# Patient Record
Sex: MALE | Race: BLACK OR AFRICAN AMERICAN | Employment: OTHER | URBAN - METROPOLITAN AREA
[De-identification: names, ages, dates, MRNs, and addresses within clinical notes are randomized per-mention and may not be internally consistent; named-entity substitution may affect disease eponyms.]

---

## 2018-06-11 ENCOUNTER — ANESTHESIA (OUTPATIENT)
Dept: SURGERY | Age: 68
End: 2018-06-11
Payer: MEDICARE

## 2018-06-11 ENCOUNTER — APPOINTMENT (OUTPATIENT)
Dept: ULTRASOUND IMAGING | Age: 68
End: 2018-06-11
Attending: EMERGENCY MEDICINE
Payer: MEDICARE

## 2018-06-11 ENCOUNTER — ANESTHESIA EVENT (OUTPATIENT)
Dept: SURGERY | Age: 68
End: 2018-06-11
Payer: MEDICARE

## 2018-06-11 ENCOUNTER — HOSPITAL ENCOUNTER (OUTPATIENT)
Age: 68
Setting detail: OBSERVATION
Discharge: HOME | End: 2018-06-12
Attending: EMERGENCY MEDICINE | Admitting: SURGERY
Payer: MEDICARE

## 2018-06-11 ENCOUNTER — APPOINTMENT (OUTPATIENT)
Dept: CT IMAGING | Age: 68
End: 2018-06-11
Attending: EMERGENCY MEDICINE
Payer: MEDICARE

## 2018-06-11 DIAGNOSIS — K80.00 ACUTE CALCULOUS CHOLECYSTITIS: ICD-10-CM

## 2018-06-11 DIAGNOSIS — R74.8 ELEVATED LIPASE: ICD-10-CM

## 2018-06-11 DIAGNOSIS — K80.00 CALCULUS OF GALLBLADDER WITH ACUTE CHOLECYSTITIS WITHOUT OBSTRUCTION: Primary | ICD-10-CM

## 2018-06-11 LAB
ALBUMIN SERPL-MCNC: 4.4 G/DL (ref 3.5–5)
ALBUMIN/GLOB SERPL: 1.1 {RATIO} (ref 1.1–2.2)
ALP SERPL-CCNC: 51 U/L (ref 45–117)
ALT SERPL-CCNC: 34 U/L (ref 12–78)
ANION GAP SERPL CALC-SCNC: 9 MMOL/L (ref 5–15)
APPEARANCE UR: CLEAR
AST SERPL-CCNC: 22 U/L (ref 15–37)
BACTERIA URNS QL MICRO: NEGATIVE /HPF
BASOPHILS # BLD: 0 K/UL (ref 0–0.1)
BASOPHILS NFR BLD: 0 % (ref 0–1)
BILIRUB SERPL-MCNC: 0.4 MG/DL (ref 0.2–1)
BILIRUB UR QL: NEGATIVE
BUN SERPL-MCNC: 13 MG/DL (ref 6–20)
BUN/CREAT SERPL: 11 (ref 12–20)
CALCIUM SERPL-MCNC: 9.5 MG/DL (ref 8.5–10.1)
CHLORIDE SERPL-SCNC: 108 MMOL/L (ref 97–108)
CO2 SERPL-SCNC: 26 MMOL/L (ref 21–32)
COLOR UR: ABNORMAL
CREAT SERPL-MCNC: 1.21 MG/DL (ref 0.7–1.3)
DIFFERENTIAL METHOD BLD: ABNORMAL
EOSINOPHIL # BLD: 0 K/UL (ref 0–0.4)
EOSINOPHIL NFR BLD: 0 % (ref 0–7)
EPITH CASTS URNS QL MICRO: ABNORMAL /LPF
ERYTHROCYTE [DISTWIDTH] IN BLOOD BY AUTOMATED COUNT: 15.3 % (ref 11.5–14.5)
GLOBULIN SER CALC-MCNC: 4 G/DL (ref 2–4)
GLUCOSE SERPL-MCNC: 119 MG/DL (ref 65–100)
GLUCOSE UR STRIP.AUTO-MCNC: NEGATIVE MG/DL
HCT VFR BLD AUTO: 42.3 % (ref 36.6–50.3)
HGB BLD-MCNC: 13.9 G/DL (ref 12.1–17)
HGB UR QL STRIP: NEGATIVE
HYALINE CASTS URNS QL MICRO: ABNORMAL /LPF (ref 0–5)
IMM GRANULOCYTES # BLD: 0 K/UL (ref 0–0.04)
IMM GRANULOCYTES NFR BLD AUTO: 0 % (ref 0–0.5)
KETONES UR QL STRIP.AUTO: NEGATIVE MG/DL
LEUKOCYTE ESTERASE UR QL STRIP.AUTO: NEGATIVE
LIPASE SERPL-CCNC: 490 U/L (ref 73–393)
LYMPHOCYTES # BLD: 0.8 K/UL (ref 0.8–3.5)
LYMPHOCYTES NFR BLD: 9 % (ref 12–49)
MCH RBC QN AUTO: 27.9 PG (ref 26–34)
MCHC RBC AUTO-ENTMCNC: 32.9 G/DL (ref 30–36.5)
MCV RBC AUTO: 84.9 FL (ref 80–99)
MONOCYTES # BLD: 0.3 K/UL (ref 0–1)
MONOCYTES NFR BLD: 4 % (ref 5–13)
NEUTS SEG # BLD: 7.5 K/UL (ref 1.8–8)
NEUTS SEG NFR BLD: 86 % (ref 32–75)
NITRITE UR QL STRIP.AUTO: NEGATIVE
NRBC # BLD: 0 K/UL (ref 0–0.01)
NRBC BLD-RTO: 0 PER 100 WBC
PH UR STRIP: 5 [PH] (ref 5–8)
PLATELET # BLD AUTO: 323 K/UL (ref 150–400)
PMV BLD AUTO: 9.5 FL (ref 8.9–12.9)
POTASSIUM SERPL-SCNC: 3.8 MMOL/L (ref 3.5–5.1)
PROT SERPL-MCNC: 8.4 G/DL (ref 6.4–8.2)
PROT UR STRIP-MCNC: 30 MG/DL
RBC # BLD AUTO: 4.98 M/UL (ref 4.1–5.7)
RBC #/AREA URNS HPF: ABNORMAL /HPF (ref 0–5)
SODIUM SERPL-SCNC: 143 MMOL/L (ref 136–145)
SP GR UR REFRACTOMETRY: 1.02 (ref 1–1.03)
UA: UC IF INDICATED,UAUC: ABNORMAL
UROBILINOGEN UR QL STRIP.AUTO: 0.2 EU/DL (ref 0.2–1)
WBC # BLD AUTO: 8.7 K/UL (ref 4.1–11.1)
WBC URNS QL MICRO: ABNORMAL /HPF (ref 0–4)

## 2018-06-11 PROCEDURE — 83690 ASSAY OF LIPASE: CPT | Performed by: EMERGENCY MEDICINE

## 2018-06-11 PROCEDURE — 77030013079 HC BLNKT BAIR HGGR 3M -A: Performed by: ANESTHESIOLOGY

## 2018-06-11 PROCEDURE — 76010000149 HC OR TIME 1 TO 1.5 HR: Performed by: SURGERY

## 2018-06-11 PROCEDURE — 74011250636 HC RX REV CODE- 250/636

## 2018-06-11 PROCEDURE — 99218 HC RM OBSERVATION: CPT

## 2018-06-11 PROCEDURE — 77030008756 HC TU IRR SUC STRY -B: Performed by: SURGERY

## 2018-06-11 PROCEDURE — 81001 URINALYSIS AUTO W/SCOPE: CPT | Performed by: EMERGENCY MEDICINE

## 2018-06-11 PROCEDURE — 77030019908 HC STETH ESOPH SIMS -A: Performed by: ANESTHESIOLOGY

## 2018-06-11 PROCEDURE — 99284 EMERGENCY DEPT VISIT MOD MDM: CPT

## 2018-06-11 PROCEDURE — 74011250636 HC RX REV CODE- 250/636: Performed by: ANESTHESIOLOGY

## 2018-06-11 PROCEDURE — 74011000250 HC RX REV CODE- 250

## 2018-06-11 PROCEDURE — 85025 COMPLETE CBC W/AUTO DIFF WBC: CPT | Performed by: EMERGENCY MEDICINE

## 2018-06-11 PROCEDURE — 74011636320 HC RX REV CODE- 636/320: Performed by: EMERGENCY MEDICINE

## 2018-06-11 PROCEDURE — 74011250636 HC RX REV CODE- 250/636: Performed by: EMERGENCY MEDICINE

## 2018-06-11 PROCEDURE — 77030011640 HC PAD GRND REM COVD -A: Performed by: SURGERY

## 2018-06-11 PROCEDURE — 74011000258 HC RX REV CODE- 258: Performed by: EMERGENCY MEDICINE

## 2018-06-11 PROCEDURE — 77030020053 HC ELECTRD LAPSCP COVD -B: Performed by: SURGERY

## 2018-06-11 PROCEDURE — 76060000033 HC ANESTHESIA 1 TO 1.5 HR: Performed by: SURGERY

## 2018-06-11 PROCEDURE — 77030035220 HC TRCR ENDOSC BLNTPRT ANCHR COVD -B: Performed by: SURGERY

## 2018-06-11 PROCEDURE — 76210000063 HC OR PH I REC FIRST 0.5 HR: Performed by: SURGERY

## 2018-06-11 PROCEDURE — 77030037892: Performed by: SURGERY

## 2018-06-11 PROCEDURE — 74011000250 HC RX REV CODE- 250: Performed by: SURGERY

## 2018-06-11 PROCEDURE — 77030039266 HC ADH SKN EXOFIN S2SG -A: Performed by: SURGERY

## 2018-06-11 PROCEDURE — 96375 TX/PRO/DX INJ NEW DRUG ADDON: CPT

## 2018-06-11 PROCEDURE — 88304 TISSUE EXAM BY PATHOLOGIST: CPT | Performed by: SURGERY

## 2018-06-11 PROCEDURE — 77030035048 HC TRCR ENDOSC OPTCL COVD -B: Performed by: SURGERY

## 2018-06-11 PROCEDURE — 74177 CT ABD & PELVIS W/CONTRAST: CPT

## 2018-06-11 PROCEDURE — 77030010513 HC APPL CLP LIG J&J -C: Performed by: SURGERY

## 2018-06-11 PROCEDURE — 36415 COLL VENOUS BLD VENIPUNCTURE: CPT | Performed by: EMERGENCY MEDICINE

## 2018-06-11 PROCEDURE — 77030002933 HC SUT MCRYL J&J -A: Performed by: SURGERY

## 2018-06-11 PROCEDURE — 76705 ECHO EXAM OF ABDOMEN: CPT

## 2018-06-11 PROCEDURE — 77030032490 HC SLV COMPR SCD KNE COVD -B: Performed by: SURGERY

## 2018-06-11 PROCEDURE — 77030008771 HC TU NG SALEM SUMP -A: Performed by: ANESTHESIOLOGY

## 2018-06-11 PROCEDURE — 77030026438 HC STYL ET INTUB CARD -A: Performed by: ANESTHESIOLOGY

## 2018-06-11 PROCEDURE — 96365 THER/PROPH/DIAG IV INF INIT: CPT

## 2018-06-11 PROCEDURE — 77030035051: Performed by: SURGERY

## 2018-06-11 PROCEDURE — 80053 COMPREHEN METABOLIC PANEL: CPT | Performed by: EMERGENCY MEDICINE

## 2018-06-11 PROCEDURE — 77030031139 HC SUT VCRL2 J&J -A: Performed by: SURGERY

## 2018-06-11 PROCEDURE — 77030008684 HC TU ET CUF COVD -B: Performed by: ANESTHESIOLOGY

## 2018-06-11 RX ORDER — ONDANSETRON 2 MG/ML
4 INJECTION INTRAMUSCULAR; INTRAVENOUS
Status: DISCONTINUED | OUTPATIENT
Start: 2018-06-11 | End: 2018-06-12 | Stop reason: HOSPADM

## 2018-06-11 RX ORDER — HYDROMORPHONE HYDROCHLORIDE 1 MG/ML
.2-.5 INJECTION, SOLUTION INTRAMUSCULAR; INTRAVENOUS; SUBCUTANEOUS
Status: DISCONTINUED | OUTPATIENT
Start: 2018-06-11 | End: 2018-06-11 | Stop reason: HOSPADM

## 2018-06-11 RX ORDER — MORPHINE SULFATE 4 MG/ML
4 INJECTION INTRAVENOUS ONCE
Status: COMPLETED | OUTPATIENT
Start: 2018-06-11 | End: 2018-06-11

## 2018-06-11 RX ORDER — LIDOCAINE HYDROCHLORIDE 20 MG/ML
INJECTION, SOLUTION EPIDURAL; INFILTRATION; INTRACAUDAL; PERINEURAL AS NEEDED
Status: DISCONTINUED | OUTPATIENT
Start: 2018-06-11 | End: 2018-06-11 | Stop reason: HOSPADM

## 2018-06-11 RX ORDER — BUPIVACAINE HYDROCHLORIDE 5 MG/ML
INJECTION, SOLUTION EPIDURAL; INTRACAUDAL AS NEEDED
Status: DISCONTINUED | OUTPATIENT
Start: 2018-06-11 | End: 2018-06-11 | Stop reason: HOSPADM

## 2018-06-11 RX ORDER — MIDAZOLAM HYDROCHLORIDE 1 MG/ML
0.5 INJECTION, SOLUTION INTRAMUSCULAR; INTRAVENOUS
Status: DISCONTINUED | OUTPATIENT
Start: 2018-06-11 | End: 2018-06-11 | Stop reason: HOSPADM

## 2018-06-11 RX ORDER — ONDANSETRON 2 MG/ML
4 INJECTION INTRAMUSCULAR; INTRAVENOUS AS NEEDED
Status: DISCONTINUED | OUTPATIENT
Start: 2018-06-11 | End: 2018-06-11 | Stop reason: HOSPADM

## 2018-06-11 RX ORDER — METOCLOPRAMIDE HYDROCHLORIDE 5 MG/ML
10 INJECTION INTRAMUSCULAR; INTRAVENOUS
Status: COMPLETED | OUTPATIENT
Start: 2018-06-11 | End: 2018-06-11

## 2018-06-11 RX ORDER — DEXAMETHASONE SODIUM PHOSPHATE 4 MG/ML
INJECTION, SOLUTION INTRA-ARTICULAR; INTRALESIONAL; INTRAMUSCULAR; INTRAVENOUS; SOFT TISSUE AS NEEDED
Status: DISCONTINUED | OUTPATIENT
Start: 2018-06-11 | End: 2018-06-11 | Stop reason: HOSPADM

## 2018-06-11 RX ORDER — GLYCOPYRROLATE 0.2 MG/ML
INJECTION INTRAMUSCULAR; INTRAVENOUS AS NEEDED
Status: DISCONTINUED | OUTPATIENT
Start: 2018-06-11 | End: 2018-06-11 | Stop reason: HOSPADM

## 2018-06-11 RX ORDER — SODIUM CHLORIDE 9 MG/ML
150 INJECTION, SOLUTION INTRAVENOUS ONCE
Status: COMPLETED | OUTPATIENT
Start: 2018-06-11 | End: 2018-06-11

## 2018-06-11 RX ORDER — SODIUM CHLORIDE 0.9 % (FLUSH) 0.9 %
5-10 SYRINGE (ML) INJECTION AS NEEDED
Status: DISCONTINUED | OUTPATIENT
Start: 2018-06-11 | End: 2018-06-11 | Stop reason: HOSPADM

## 2018-06-11 RX ORDER — SODIUM CHLORIDE 0.9 % (FLUSH) 0.9 %
5-10 SYRINGE (ML) INJECTION EVERY 8 HOURS
Status: DISCONTINUED | OUTPATIENT
Start: 2018-06-11 | End: 2018-06-12 | Stop reason: HOSPADM

## 2018-06-11 RX ORDER — DEXTROSE, SODIUM CHLORIDE, AND POTASSIUM CHLORIDE 5; .45; .15 G/100ML; G/100ML; G/100ML
INJECTION INTRAVENOUS
Status: COMPLETED
Start: 2018-06-11 | End: 2018-06-11

## 2018-06-11 RX ORDER — MIDAZOLAM HYDROCHLORIDE 1 MG/ML
INJECTION, SOLUTION INTRAMUSCULAR; INTRAVENOUS AS NEEDED
Status: DISCONTINUED | OUTPATIENT
Start: 2018-06-11 | End: 2018-06-11 | Stop reason: HOSPADM

## 2018-06-11 RX ORDER — ACETAMINOPHEN 10 MG/ML
INJECTION, SOLUTION INTRAVENOUS AS NEEDED
Status: DISCONTINUED | OUTPATIENT
Start: 2018-06-11 | End: 2018-06-11 | Stop reason: HOSPADM

## 2018-06-11 RX ORDER — SODIUM CHLORIDE 0.9 % (FLUSH) 0.9 %
5-10 SYRINGE (ML) INJECTION AS NEEDED
Status: DISCONTINUED | OUTPATIENT
Start: 2018-06-11 | End: 2018-06-12 | Stop reason: HOSPADM

## 2018-06-11 RX ORDER — DEXTROSE, SODIUM CHLORIDE, AND POTASSIUM CHLORIDE 5; .45; .15 G/100ML; G/100ML; G/100ML
125 INJECTION INTRAVENOUS CONTINUOUS
Status: DISCONTINUED | OUTPATIENT
Start: 2018-06-11 | End: 2018-06-12 | Stop reason: HOSPADM

## 2018-06-11 RX ORDER — ONDANSETRON 2 MG/ML
8 INJECTION INTRAMUSCULAR; INTRAVENOUS
Status: COMPLETED | OUTPATIENT
Start: 2018-06-11 | End: 2018-06-11

## 2018-06-11 RX ORDER — FINASTERIDE 5 MG/1
5 TABLET, FILM COATED ORAL DAILY
COMMUNITY

## 2018-06-11 RX ORDER — LIDOCAINE HYDROCHLORIDE 10 MG/ML
0.1 INJECTION, SOLUTION EPIDURAL; INFILTRATION; INTRACAUDAL; PERINEURAL AS NEEDED
Status: DISCONTINUED | OUTPATIENT
Start: 2018-06-11 | End: 2018-06-11 | Stop reason: HOSPADM

## 2018-06-11 RX ORDER — HYDROMORPHONE HYDROCHLORIDE 2 MG/ML
0.5 INJECTION, SOLUTION INTRAMUSCULAR; INTRAVENOUS; SUBCUTANEOUS
Status: DISCONTINUED | OUTPATIENT
Start: 2018-06-11 | End: 2018-06-12 | Stop reason: HOSPADM

## 2018-06-11 RX ORDER — FENTANYL CITRATE 50 UG/ML
INJECTION, SOLUTION INTRAMUSCULAR; INTRAVENOUS AS NEEDED
Status: DISCONTINUED | OUTPATIENT
Start: 2018-06-11 | End: 2018-06-11 | Stop reason: HOSPADM

## 2018-06-11 RX ORDER — DIPHENHYDRAMINE HYDROCHLORIDE 50 MG/ML
25 INJECTION, SOLUTION INTRAMUSCULAR; INTRAVENOUS
Status: COMPLETED | OUTPATIENT
Start: 2018-06-11 | End: 2018-06-11

## 2018-06-11 RX ORDER — EPHEDRINE SULFATE 50 MG/ML
INJECTION, SOLUTION INTRAVENOUS AS NEEDED
Status: DISCONTINUED | OUTPATIENT
Start: 2018-06-11 | End: 2018-06-11 | Stop reason: HOSPADM

## 2018-06-11 RX ORDER — FENTANYL CITRATE 50 UG/ML
25 INJECTION, SOLUTION INTRAMUSCULAR; INTRAVENOUS
Status: DISCONTINUED | OUTPATIENT
Start: 2018-06-11 | End: 2018-06-11 | Stop reason: HOSPADM

## 2018-06-11 RX ORDER — FENOFIBRATE 120 MG/1
TABLET ORAL
COMMUNITY
End: 2019-01-18

## 2018-06-11 RX ORDER — SODIUM CHLORIDE 0.9 % (FLUSH) 0.9 %
10 SYRINGE (ML) INJECTION
Status: COMPLETED | OUTPATIENT
Start: 2018-06-11 | End: 2018-06-11

## 2018-06-11 RX ORDER — HYDROMORPHONE HYDROCHLORIDE 1 MG/ML
0.5 INJECTION, SOLUTION INTRAMUSCULAR; INTRAVENOUS; SUBCUTANEOUS
Status: DISCONTINUED | OUTPATIENT
Start: 2018-06-11 | End: 2018-06-11

## 2018-06-11 RX ORDER — PROPOFOL 10 MG/ML
INJECTION, EMULSION INTRAVENOUS AS NEEDED
Status: DISCONTINUED | OUTPATIENT
Start: 2018-06-11 | End: 2018-06-11 | Stop reason: HOSPADM

## 2018-06-11 RX ORDER — SODIUM CHLORIDE 9 MG/ML
50 INJECTION, SOLUTION INTRAVENOUS
Status: COMPLETED | OUTPATIENT
Start: 2018-06-11 | End: 2018-06-11

## 2018-06-11 RX ORDER — NEOSTIGMINE METHYLSULFATE 1 MG/ML
INJECTION INTRAVENOUS AS NEEDED
Status: DISCONTINUED | OUTPATIENT
Start: 2018-06-11 | End: 2018-06-11 | Stop reason: HOSPADM

## 2018-06-11 RX ORDER — ROCURONIUM BROMIDE 10 MG/ML
INJECTION, SOLUTION INTRAVENOUS AS NEEDED
Status: DISCONTINUED | OUTPATIENT
Start: 2018-06-11 | End: 2018-06-11 | Stop reason: HOSPADM

## 2018-06-11 RX ORDER — HYDROCODONE BITARTRATE AND ACETAMINOPHEN 5; 325 MG/1; MG/1
1 TABLET ORAL
Status: DISCONTINUED | OUTPATIENT
Start: 2018-06-11 | End: 2018-06-12 | Stop reason: HOSPADM

## 2018-06-11 RX ORDER — SUCCINYLCHOLINE CHLORIDE 20 MG/ML
INJECTION INTRAMUSCULAR; INTRAVENOUS AS NEEDED
Status: DISCONTINUED | OUTPATIENT
Start: 2018-06-11 | End: 2018-06-11 | Stop reason: HOSPADM

## 2018-06-11 RX ORDER — GUAIFENESIN 100 MG/5ML
81 LIQUID (ML) ORAL DAILY
Status: ON HOLD | COMMUNITY
End: 2019-01-18 | Stop reason: SDUPTHER

## 2018-06-11 RX ORDER — ONDANSETRON 2 MG/ML
INJECTION INTRAMUSCULAR; INTRAVENOUS AS NEEDED
Status: DISCONTINUED | OUTPATIENT
Start: 2018-06-11 | End: 2018-06-11 | Stop reason: HOSPADM

## 2018-06-11 RX ORDER — TAMSULOSIN HYDROCHLORIDE 0.4 MG/1
0.4 CAPSULE ORAL DAILY
COMMUNITY

## 2018-06-11 RX ORDER — SODIUM CHLORIDE, SODIUM LACTATE, POTASSIUM CHLORIDE, CALCIUM CHLORIDE 600; 310; 30; 20 MG/100ML; MG/100ML; MG/100ML; MG/100ML
25 INJECTION, SOLUTION INTRAVENOUS CONTINUOUS
Status: DISCONTINUED | OUTPATIENT
Start: 2018-06-11 | End: 2018-06-11 | Stop reason: HOSPADM

## 2018-06-11 RX ORDER — FENTANYL CITRATE 50 UG/ML
50 INJECTION, SOLUTION INTRAMUSCULAR; INTRAVENOUS AS NEEDED
Status: DISCONTINUED | OUTPATIENT
Start: 2018-06-11 | End: 2018-06-11 | Stop reason: HOSPADM

## 2018-06-11 RX ORDER — DIPHENHYDRAMINE HYDROCHLORIDE 50 MG/ML
12.5 INJECTION, SOLUTION INTRAMUSCULAR; INTRAVENOUS AS NEEDED
Status: DISCONTINUED | OUTPATIENT
Start: 2018-06-11 | End: 2018-06-11 | Stop reason: HOSPADM

## 2018-06-11 RX ORDER — MORPHINE SULFATE 10 MG/ML
2 INJECTION, SOLUTION INTRAMUSCULAR; INTRAVENOUS
Status: DISCONTINUED | OUTPATIENT
Start: 2018-06-11 | End: 2018-06-11 | Stop reason: HOSPADM

## 2018-06-11 RX ADMIN — NEOSTIGMINE METHYLSULFATE 2 MG: 1 INJECTION INTRAVENOUS at 21:20

## 2018-06-11 RX ADMIN — SODIUM CHLORIDE 1000 ML: 900 INJECTION, SOLUTION INTRAVENOUS at 14:13

## 2018-06-11 RX ADMIN — GLYCOPYRROLATE 0.2 MG: 0.2 INJECTION INTRAMUSCULAR; INTRAVENOUS at 21:20

## 2018-06-11 RX ADMIN — FENTANYL CITRATE 50 MCG: 50 INJECTION, SOLUTION INTRAMUSCULAR; INTRAVENOUS at 20:36

## 2018-06-11 RX ADMIN — DEXTROSE MONOHYDRATE, SODIUM CHLORIDE, AND POTASSIUM CHLORIDE 125 ML/HR: 50; 4.5; 1.49 INJECTION, SOLUTION INTRAVENOUS at 21:50

## 2018-06-11 RX ADMIN — IOPAMIDOL 100 ML: 755 INJECTION, SOLUTION INTRAVENOUS at 16:06

## 2018-06-11 RX ADMIN — LIDOCAINE HYDROCHLORIDE 100 MG: 20 INJECTION, SOLUTION EPIDURAL; INFILTRATION; INTRACAUDAL; PERINEURAL at 20:36

## 2018-06-11 RX ADMIN — MIDAZOLAM HYDROCHLORIDE 2 MG: 1 INJECTION, SOLUTION INTRAMUSCULAR; INTRAVENOUS at 20:31

## 2018-06-11 RX ADMIN — METOCLOPRAMIDE 10 MG: 5 INJECTION, SOLUTION INTRAMUSCULAR; INTRAVENOUS at 17:49

## 2018-06-11 RX ADMIN — PROPOFOL 150 MG: 10 INJECTION, EMULSION INTRAVENOUS at 20:36

## 2018-06-11 RX ADMIN — ROCURONIUM BROMIDE 25 MG: 10 INJECTION, SOLUTION INTRAVENOUS at 20:48

## 2018-06-11 RX ADMIN — SODIUM CHLORIDE 150 ML/HR: 900 INJECTION, SOLUTION INTRAVENOUS at 17:50

## 2018-06-11 RX ADMIN — MORPHINE SULFATE 4 MG: 4 INJECTION INTRAVENOUS at 14:12

## 2018-06-11 RX ADMIN — DEXAMETHASONE SODIUM PHOSPHATE 8 MG: 4 INJECTION, SOLUTION INTRA-ARTICULAR; INTRALESIONAL; INTRAMUSCULAR; INTRAVENOUS; SOFT TISSUE at 20:55

## 2018-06-11 RX ADMIN — ACETAMINOPHEN 1000 MG: 10 INJECTION, SOLUTION INTRAVENOUS at 21:06

## 2018-06-11 RX ADMIN — SUCCINYLCHOLINE CHLORIDE 140 MG: 20 INJECTION INTRAMUSCULAR; INTRAVENOUS at 20:37

## 2018-06-11 RX ADMIN — DEXTROSE, SODIUM CHLORIDE, AND POTASSIUM CHLORIDE 125 ML/HR: 5; .45; .15 INJECTION INTRAVENOUS at 21:50

## 2018-06-11 RX ADMIN — PIPERACILLIN SODIUM,TAZOBACTAM SODIUM 4.5 G: 4; .5 INJECTION, POWDER, FOR SOLUTION INTRAVENOUS at 18:37

## 2018-06-11 RX ADMIN — Medication 10 ML: at 16:06

## 2018-06-11 RX ADMIN — EPHEDRINE SULFATE 10 MG: 50 INJECTION, SOLUTION INTRAVENOUS at 20:43

## 2018-06-11 RX ADMIN — Medication 10 ML: at 22:30

## 2018-06-11 RX ADMIN — FENTANYL CITRATE 50 MCG: 50 INJECTION, SOLUTION INTRAMUSCULAR; INTRAVENOUS at 20:55

## 2018-06-11 RX ADMIN — PROPOFOL 50 MG: 10 INJECTION, EMULSION INTRAVENOUS at 20:37

## 2018-06-11 RX ADMIN — SODIUM CHLORIDE 50 ML/HR: 900 INJECTION, SOLUTION INTRAVENOUS at 16:06

## 2018-06-11 RX ADMIN — ONDANSETRON 4 MG: 2 INJECTION INTRAMUSCULAR; INTRAVENOUS at 20:55

## 2018-06-11 RX ADMIN — DIPHENHYDRAMINE HYDROCHLORIDE 25 MG: 50 INJECTION INTRAMUSCULAR; INTRAVENOUS at 17:49

## 2018-06-11 RX ADMIN — SODIUM CHLORIDE, POTASSIUM CHLORIDE, SODIUM LACTATE AND CALCIUM CHLORIDE: 600; 310; 30; 20 INJECTION, SOLUTION INTRAVENOUS at 20:32

## 2018-06-11 RX ADMIN — ONDANSETRON 8 MG: 2 INJECTION, SOLUTION INTRAMUSCULAR; INTRAVENOUS at 14:12

## 2018-06-11 NOTE — H&P
Surgery History and Physcial    Subjective:      Norman Ewing is a 75 y.o. male who presents for evaluation of abdominal pain. The pain is located in the RUQ without radiation. Pain is described as sharp and stabbing and measures 8/10 in intensity. Onset of pain was 20 hours ago. Aggravating factors include eating. Alleviating factors include none. Associated symptoms include anorexia, chills, diarrhea, nausea and vomiting. Patient Active Problem List    Diagnosis Date Noted    Acute calculous cholecystitis 06/11/2018     History reviewed. No pertinent past medical history. History reviewed. No pertinent surgical history. Social History   Substance Use Topics    Smoking status: Not on file    Smokeless tobacco: Not on file    Alcohol use Not on file      History reviewed. No pertinent family history. Prior to Admission medications    Medication Sig Start Date End Date Taking? Authorizing Provider   finasteride (PROSCAR) 5 mg tablet Take 5 mg by mouth daily. Yes Michelle Cummings MD   Fenofibrate 120 mg tab Take  by mouth. Yes Michelle Cummings MD   tamsulosin (FLOMAX) 0.4 mg capsule Take 0.4 mg by mouth daily. Yes Michelle Cummings MD   aspirin 81 mg chewable tablet Take 81 mg by mouth daily. Yes Michelle Cummings MD   SIMVASTATIN PO Take  by mouth. Yes Michelle Cummings MD     No Known Allergies      Review of Systems   Constitutional: Positive for appetite change and chills. Negative for diaphoresis and fever. Respiratory: Negative for shortness of breath and wheezing. Cardiovascular: Negative for chest pain and palpitations. Gastrointestinal: Positive for abdominal pain, diarrhea, nausea and vomiting. Musculoskeletal: Negative for myalgias. Hematological: Does not bruise/bleed easily.         Objective:     Visit Vitals    /86    Pulse (!) 50    Temp 99.1 °F (37.3 °C)    Resp 15    Ht 5' 7\" (1.702 m)    Wt 156 lb 15.5 oz (71.2 kg)    SpO2 100%    BMI 24.58 kg/m2       Physical Exam Constitutional: He appears well-developed and well-nourished. No distress. HENT:   Head: Normocephalic and atraumatic. Cardiovascular: Normal rate, regular rhythm, normal heart sounds and intact distal pulses. Pulmonary/Chest: Breath sounds normal. He has no wheezes. He has no rales. Abdominal: Soft. Bowel sounds are normal. He exhibits no distension and no mass. There is no hepatosplenomegaly. There is tenderness in the right upper quadrant. There is positive Hawk's sign. There is no rigidity, no rebound, no guarding and no tenderness at McBurney's point. No hernia. Musculoskeletal: Normal range of motion. Lymphadenopathy:     He has no cervical adenopathy.        Imaging:  images and reports reviewed    Lab Review:    Recent Results (from the past 24 hour(s))   URINALYSIS W/ REFLEX CULTURE    Collection Time: 06/11/18  1:48 PM   Result Value Ref Range    Color YELLOW/STRAW      Appearance CLEAR CLEAR      Specific gravity 1.025 1.003 - 1.030      pH (UA) 5.0 5.0 - 8.0      Protein 30 (A) NEG mg/dL    Glucose NEGATIVE  NEG mg/dL    Ketone NEGATIVE  NEG mg/dL    Bilirubin NEGATIVE  NEG      Blood NEGATIVE  NEG      Urobilinogen 0.2 0.2 - 1.0 EU/dL    Nitrites NEGATIVE  NEG      Leukocyte Esterase NEGATIVE  NEG      WBC 0-4 0 - 4 /hpf    RBC 5-10 0 - 5 /hpf    Epithelial cells FEW FEW /lpf    Bacteria NEGATIVE  NEG /hpf    UA:UC IF INDICATED CULTURE NOT INDICATED BY UA RESULT CNI      Hyaline cast 0-2 0 - 5 /lpf   CBC WITH AUTOMATED DIFF    Collection Time: 06/11/18  2:07 PM   Result Value Ref Range    WBC 8.7 4.1 - 11.1 K/uL    RBC 4.98 4.10 - 5.70 M/uL    HGB 13.9 12.1 - 17.0 g/dL    HCT 42.3 36.6 - 50.3 %    MCV 84.9 80.0 - 99.0 FL    MCH 27.9 26.0 - 34.0 PG    MCHC 32.9 30.0 - 36.5 g/dL    RDW 15.3 (H) 11.5 - 14.5 %    PLATELET 651 187 - 328 K/uL    MPV 9.5 8.9 - 12.9 FL    NRBC 0.0 0  WBC    ABSOLUTE NRBC 0.00 0.00 - 0.01 K/uL    NEUTROPHILS 86 (H) 32 - 75 %    LYMPHOCYTES 9 (L) 12 - 49 %    MONOCYTES 4 (L) 5 - 13 %    EOSINOPHILS 0 0 - 7 %    BASOPHILS 0 0 - 1 %    IMMATURE GRANULOCYTES 0 0.0 - 0.5 %    ABS. NEUTROPHILS 7.5 1.8 - 8.0 K/UL    ABS. LYMPHOCYTES 0.8 0.8 - 3.5 K/UL    ABS. MONOCYTES 0.3 0.0 - 1.0 K/UL    ABS. EOSINOPHILS 0.0 0.0 - 0.4 K/UL    ABS. BASOPHILS 0.0 0.0 - 0.1 K/UL    ABS. IMM. GRANS. 0.0 0.00 - 0.04 K/UL    DF AUTOMATED     METABOLIC PANEL, COMPREHENSIVE    Collection Time: 06/11/18  2:07 PM   Result Value Ref Range    Sodium 143 136 - 145 mmol/L    Potassium 3.8 3.5 - 5.1 mmol/L    Chloride 108 97 - 108 mmol/L    CO2 26 21 - 32 mmol/L    Anion gap 9 5 - 15 mmol/L    Glucose 119 (H) 65 - 100 mg/dL    BUN 13 6 - 20 MG/DL    Creatinine 1.21 0.70 - 1.30 MG/DL    BUN/Creatinine ratio 11 (L) 12 - 20      GFR est AA >60 >60 ml/min/1.73m2    GFR est non-AA 60 (L) >60 ml/min/1.73m2    Calcium 9.5 8.5 - 10.1 MG/DL    Bilirubin, total 0.4 0.2 - 1.0 MG/DL    ALT (SGPT) 34 12 - 78 U/L    AST (SGOT) 22 15 - 37 U/L    Alk. phosphatase 51 45 - 117 U/L    Protein, total 8.4 (H) 6.4 - 8.2 g/dL    Albumin 4.4 3.5 - 5.0 g/dL    Globulin 4.0 2.0 - 4.0 g/dL    A-G Ratio 1.1 1.1 - 2.2     LIPASE    Collection Time: 06/11/18  2:07 PM   Result Value Ref Range    Lipase 490 (H) 73 - 393 U/L         Assessment:     Abdominal pain, suspect acute calculous cholecystitis with associated gallbladder wall thickening on ultrasound. LFT's and CBD diameter wnl. Plan:     1. I recommend proceeding with Surgery:  laparoscopic cholecystectomy. 2. Discussed aspects of surgical intervention, methods, risks including by not limited to infection, bleeding, hematoma, and perforation of the intestines or solid organs, injury to common bile duct, conversion to open operation, need for additional procedures, and the risks of general anesthetic. The patient understands the risks; any and all questions were answered to the patient's satisfaction.     Signed By: True Holt MD, [facility]    June 11, 2018

## 2018-06-11 NOTE — ED NOTES
Pt report given to or, pt instructed to remove clothing, do chlorhexidine bath and replace new gown.

## 2018-06-11 NOTE — IP AVS SNAPSHOT
[address] [facility] 
[phone] Patient: Cherry Caldwell MRN: [id number] CSS:[id number] A check mellisa indicates which time of day the medication should be taken. My Medications START taking these medications Instructions Each Dose to Equal  
 Morning Noon Evening Bedtime HYDROcodone-acetaminophen 5-325 mg per tablet Commonly known as:  Shanelle Menchaca Your last dose was: Your next dose is: Take 1 Tab by mouth every four (4) hours as needed. Max Daily Amount: 6 Tabs. 1 Tab CONTINUE taking these medications Instructions Each Dose to Equal  
 Morning Noon Evening Bedtime  
 aspirin 81 mg chewable tablet Your last dose was: Your next dose is: Take 81 mg by mouth daily. 81 mg Fenofibrate 120 mg Tab Your last dose was: Your next dose is: Take  by mouth. finasteride 5 mg tablet Commonly known as:  PROSCAR Your last dose was: Your next dose is: Take 5 mg by mouth daily. 5 mg  
    
   
   
   
  
 SIMVASTATIN PO Your last dose was: Your next dose is: Take  by mouth. tamsulosin 0.4 mg capsule Commonly known as:  FLOMAX Your last dose was: Your next dose is: Take 0.4 mg by mouth daily. 0.4 mg Where to Get Your Medications Information on where to get these meds will be given to you by the nurse or doctor. ! Ask your nurse or doctor about these medications HYDROcodone-acetaminophen 5-325 mg per tablet

## 2018-06-11 NOTE — IP AVS SNAPSHOT
[address] 
[phone] Patient: Jacqueline Zaragoza MRN: [id number] RFY:[id number] About your hospitalization You were admitted on:  June 11, 2018 You last received care in the:  \[facility]\"" 2 GENERAL SURGERY You were discharged on:  June 12, 2018 Why you were hospitalized Your primary diagnosis was:  Acute Calculous Cholecystitis Follow-up Information Follow up With Details Comments Contact Info Cathryn Lopez MD   [address] [address] 
[phone] Discharge Orders None A check mellisa indicates which time of day the medication should be taken. My Medications START taking these medications Instructions Each Dose to Equal  
 Morning Noon Evening Bedtime HYDROcodone-acetaminophen 5-325 mg per tablet Commonly known as:  Marvelyn Code Your last dose was: Your next dose is: Take 1 Tab by mouth every four (4) hours as needed. Max Daily Amount: 6 Tabs. 1 Tab CONTINUE taking these medications Instructions Each Dose to Equal  
 Morning Noon Evening Bedtime  
 aspirin 81 mg chewable tablet Your last dose was: Your next dose is: Take 81 mg by mouth daily. 81 mg Fenofibrate 120 mg Tab Your last dose was: Your next dose is: Take  by mouth. finasteride 5 mg tablet Commonly known as:  PROSCAR Your last dose was: Your next dose is: Take 5 mg by mouth daily. 5 mg  
    
   
   
   
  
 SIMVASTATIN PO Your last dose was: Your next dose is: Take  by mouth. tamsulosin 0.4 mg capsule Commonly known as:  FLOMAX Your last dose was: Your next dose is: Take 0.4 mg by mouth daily.   
 0.4 mg  
    
   
 Where to Get Your Medications Information on where to get these meds will be given to you by the nurse or doctor. ! Ask your nurse or doctor about these medications HYDROcodone-acetaminophen 5-325 mg per tablet Opioid Education Prescription Opioids: What You Need to Know: 
 
Prescription opioids can be used to help relieve moderate-to-severe pain and are often prescribed following a surgery or injury, or for certain health conditions. These medications can be an important part of treatment but also come with serious risks. Opioids are strong pain medicines. Examples include hydrocodone, oxycodone, fentanyl, and morphine. Heroin is an example of an illegal opioid. It is important to work with your health care provider to make sure you are getting the safest, most effective care. WHAT ARE THE RISKS AND SIDE EFFECTS OF OPIOID USE? Prescription opioids carry serious risks of addiction and overdose, especially with prolonged use. An opioid overdose, often marked by slow breathing, can cause sudden death. The use of prescription opioids can have a number of side effects as well, even when taken as directed. · Tolerance-meaning you might need to take more of a medication for the same pain relief · Physical dependence-meaning you have symptoms of withdrawal when the medication is stopped. Withdrawal symptoms can include nausea, sweating, chills, diarrhea, stomach cramps, and muscle aches. Withdrawal can last up to several weeks, depending on which drug you took and how long you took it. · Increased sensitivity to pain · Constipation · Nausea, vomiting, and dry mouth · Sleepiness and dizziness · Confusion · Depression · Low levels of testosterone that can result in lower sex drive, energy, and strength · Itching and sweating RISKS ARE GREATER WITH:      
· History of drug misuse, substance use disorder, or overdose · Mental health conditions (such as depression or anxiety) · Sleep apnea · Older age (72 years or older) · Pregnancy Avoid alcohol while taking prescription opioids. Also, unless specifically advised by your health care provider, medications to avoid include: · Benzodiazepines (such as Xanax or Valium) · Muscle relaxants (such as Soma or Flexeril) · Hypnotics (such as Ambien or Lunesta) · Other prescription opioids KNOW YOUR OPTIONS Talk to your health care provider about ways to manage your pain that don't involve prescription opioids. Some of these options may actually work better and have fewer risks and side effects. Options may include: 
· Pain relievers such as acetaminophen, ibuprofen, and naproxen · Some medications that are also used for depression or seizures · Physical therapy and exercise · Counseling to help patients learn how to cope better with triggers of pain and stress. · Application of heat or cold compress · Massage therapy · Relaxation techniques Be Informed Make sure you know the name of your medication, how much and how often to take it, and its potential risks & side effects. IF YOU ARE PRESCRIBED OPIOIDS FOR PAIN: 
· Never take opioids in greater amounts or more often than prescribed. Remember the goal is not to be pain-free but to manage your pain at a tolerable level. · Follow up with your primary care provider to: · Work together to create a plan on how to manage your pain. · Talk about ways to help manage your pain that don't involve prescription opioids. · Talk about any and all concerns and side effects. · Help prevent misuse and abuse. · Never sell or share prescription opioids · Help prevent misuse and abuse. · Store prescription opioids in a secure place and out of reach of others (this may include visitors, children, friends, and family).  
· Safely dispose of unused/unwanted prescription opioids: Find your community drug take-back program or your pharmacy mail-back program, or flush them down the toilet, following guidance from the Food and Drug Administration (www.fda.gov/Drugs/ResourcesForYou). · Visit www.cdc.gov/drugoverdose to learn about the risks of opioid abuse and overdose. · If you believe you may be struggling with addiction, tell your health care provider and ask for guidance or call [facility] at 8-297-459-NUWM. Discharge Instructions Cholecystectomy: What to Expect at [facility] Your Recovery After your surgery, it is normal to feel weak and tired for several days after you return home. Your belly may be swollen. If you had laparoscopic surgery, you may also have pain in your shoulder for about 24 hours. You may have gas or need to burp a lot at first, and a few people get diarrhea. The diarrhea usually goes away in 2 to 4 weeks, but it may last longer. How quickly you recover depends on whether you had a laparoscopic or open surgery. · For a laparoscopic surgery, most people can go back to work or their normal routine in 1 to 2 weeks, but it may take longer, depending on the type of work you do. · For an open surgery, it will probably take 4 to 6 weeks before you get back to your normal routine. This care sheet gives you a general idea about how long it will take for you to recover. However, each person recovers at a different pace. Follow the steps below to get better as quickly as possible. How can you care for yourself at home? Activity · Rest when you feel tired. Getting enough sleep will help you recover. · Try to walk each day. Start out by walking a little more than you did the day before. Gradually increase the amount you walk. Walking boosts blood flow and helps prevent pneumonia and constipation.  
· For about 2 to 4 weeks, avoid lifting anything that would make you strain. This may include a child, heavy grocery bags and milk containers, a heavy briefcase or backpack, cat litter or dog food bags, or a vacuum . · Avoid strenuous activities, such as biking, jogging, weightlifting, and aerobic exercise, until your doctor says it is okay. · You may shower 24 to 48 hours after surgery, if your doctor okays it. Pat the cut (incision) dry. Do not take a bath for the first 2 weeks, or until your doctor tells you it is okay. · You may drive when you are no longer taking pain medicine and can quickly move your foot from the gas pedal to the brake. You must also be able to sit comfortably for a long period of time, even if you do not plan to go far. You might get caught in traffic. · For a laparoscopic surgery, most people can go back to work or their normal routine in 1 to 2 weeks, but it may take longer. For an open surgery, it will probably take 4 to 6 weeks before you get back to your normal routine. · Your doctor will tell you when you can have sex again. Diet · Eat smaller meals more often instead of fewer larger meals. You can eat a normal diet, but avoid eating fatty foods for about 1 month. Fatty foods include hamburger, whole milk, cheese, and many snack foods. If your stomach is upset, try bland, low-fat foods like plain rice, broiled chicken, toast, and yogurt. · Drink plenty of fluids (unless your doctor tells you not to). · If you have diarrhea, try avoiding spicy foods, dairy products, fatty foods, and alcohol. You can also watch to see if specific foods cause it, and stop eating them. If the diarrhea continues for more than 2 weeks, talk to your doctor. · You may notice that your bowel movements are not regular right after your surgery. This is common. Try to avoid constipation and straining with bowel movements. You may want to take a fiber supplement every day.  If you have not had a bowel movement after a couple of days, ask your doctor about taking a mild laxative. Medicines · Take pain medicines exactly as directed. ¨ If the doctor gave you a prescription medicine for pain, take it as prescribed. ¨ If you are not taking a prescription pain medicine, take an over-the-counter medicine such as acetaminophen (Tylenol), ibuprofen (Advil, Motrin), or naproxen (Aleve). Read and follow all instructions on the label. ¨ Do not take two or more pain medicines at the same time unless the doctor told you to. Many pain medicines contain acetaminophen, which is Tylenol. Too much Tylenol can be harmful. · If you think your pain medicine is making you sick to your stomach: 
¨ Take your medicine after meals (unless your doctor tells you not to). ¨ Ask your doctor for a different pain medicine. · If your doctor prescribed antibiotics, take them as directed. Do not stop taking them just because you feel better. You need to take the full course of antibiotics. Incision care · If you have strips of tape on the incision, or cut, leave the tape on for a week or until it falls off. · After 24 to 48 hours, wash the area daily with warm, soapy water, and pat it dry. · You may have staples to hold the cut together. Keep them dry until your doctor takes them out. This is usually in 7 to 10 days. · Keep the area clean and dry. You may cover it with a gauze bandage if it weeps or rubs against clothing. Change the bandage every day. Ice · To reduce swelling and pain, put ice or a cold pack on your belly for 10 to 20 minutes at a time. Do this every 1 to 2 hours. Put a thin cloth between the ice and your skin. Follow-up care is a key part of your treatment and safety. Be sure to make and go to all appointments, and call your doctor if you are having problems. Its also a good idea to know your test results and keep a list of the medicines you take. When should you call for help? Call 911 anytime you think you may need emergency care. For example, call if: · You passed out (lost consciousness). · You have severe trouble breathing. · You have sudden chest pain and shortness of breath, or you cough up blood. Call your doctor now or seek immediate medical care if: 
· You are sick to your stomach and cannot drink fluids. · You have pain that does not get better when you take your pain medicine. · You have signs of infection, such as: 
¨ Increased pain, swelling, warmth, or redness. ¨ Red streaks leading from the incision. ¨ Pus draining from the incision. ¨ Swollen lymph nodes in your neck, armpits, or groin. ¨ A fever. · Your urine turns dark brown or your stool is light-colored or surya-colored. · Your skin or the whites of your eyes turn yellow. · Bright red blood has soaked through a large bandage over your incision. · You have signs of a blood clot, such as: 
¨ Pain in your calf, back of knee, thigh, or groin. ¨ Redness and swelling in your leg or groin. · You have trouble passing urine or stool, especially if you have mild pain or swelling in your lower belly. Watch closely for any changes in your health, and be sure to contact your doctor if: 
· You had a laparoscopic surgery and your shoulder pain lasts more than 24 hours. · You do not have a bowel movement after taking a laxative. Where can you learn more? Go to Guomai.be Enter [phone] in the search box to learn more about \"Cholecystectomy: What to Expect at Home. \"  
© 3865-6686 Healthwise, Incorporated. Care instructions adapted under license by [facility] (which disclaims liability or warranty for this information). This care instruction is for use with your licensed healthcare professional. If you have questions about a medical condition or this instruction, always ask your healthcare professional. [place] [address] any warranty or liability for your use of this information. Content Version: 24.4.054213; Current as of: November 14, 2014 [employer] Announcement We are excited to announce that we are making your provider's discharge notes available to you in [employer]. You will see these notes when they are completed and signed by the physician that discharged you from your recent hospital stay. If you have any questions or concerns about any information you see in [employer], please call the Health Information Department where you were seen or reach out to your Primary Care Provider for more information about your plan of care. Introducing [facility]! Oly Moy introduces [employer] patient portal. Now you can access parts of your medical record, email your doctor's office, and request medication refills online. 1. In your internet browser, go to [email or web address] 2. Click on the First Time User? Click Here link in the Sign In box. You will see the New Member Sign Up page. 3. Enter your [employer] Access Code exactly as it appears below. You will not need to use this code after youve completed the sign-up process. If you do not sign up before the expiration date, you must request a new code. · [employer] Access Code: [email or web address] Expires: 9/9/2018  2:17 PM 
 
4. Enter the last four digits of your Social Security Number (xxxx) and Date of Birth (mm/dd/yyyy) as indicated and click Submit. You will be taken to the next sign-up page. 5. Create a [employer] ID. This will be your [employer] login ID and cannot be changed, so think of one that is secure and easy to remember. 6. Create a [employer] password. You can change your password at any time. 7. Enter your Password Reset Question and Answer. This can be used at a later time if you forget your password. 8. Enter your e-mail address. You will receive e-mail notification when new information is available in [address] 9. Click Sign Up. You can now view and download portions of your medical record. 10. Click the Download Summary menu link to download a portable copy of your medical information. If you have questions, please visit the Frequently Asked Questions section of the [employer] website. Remember, [employer] is NOT to be used for urgent needs. For medical emergencies, dial 911. Now available from your iPhone and Android! Introducing Shalom Banks As a [facility] patient, I wanted to make you aware of our electronic visit tool called Shalom Banks. [employer] allows you to connect within minutes with a medical provider 24 hours a day, seven days a week via a mobile device or tablet or logging into a secure website from your computer. You can access Shalom Banks from anywhere in the [place]. A virtual visit might be right for you when you have a simple condition and feel like you just dont want to get out of bed, or cant get away from work for an appointment, when your regular [facility] provider is not available (evenings, weekends or holidays), or when youre out of town and need minor care. Electronic visits cost only $49 and if the [employer] provider determines a prescription is needed to treat your condition, one can be electronically transmitted to a nearby pharmacy*. Please take a moment to enroll today if you have not already done so. The enrollment process is free and takes just a few minutes. To enroll, please download the [employer] pako to your tablet or phone, or visit [email or web address] to enroll on your computer. And, as an [address] [place] [address] patient with a [email or web address] account, the results of your visits will be scanned into your electronic medical record and your primary care provider will be able to view the scanned results. We urge you to continue to see your regular [facility] provider for your ongoing medical care.   And while your primary care provider may not be the one available when you seek a Shalom Kangginofin virtual visit, the peace of mind you get from getting a real diagnosis real time can be priceless. For more information on Shalom Perryfin, view our Frequently Asked Questions (FAQs) at [email or web address]. Sincerely, 
 
Jasper Root MD 
Chief Medical Officer Hieu Jay *:  certain medications cannot be prescribed via Shalom Kangbay Unresulted tests-please follow up with your PCP on these results Procedure/Test Authorizing Provider CBC WITH AUTOMATED DIFF Adriana Raymond MD  
 CT ABD PELV W CONT [facility] [place]  
 METABOLIC PANEL, [address] [facility] MD [place]  
 [address] [facility] MD [place]  
 [address] MD [place]  
  
Providers Seen During Your Hospitalization Provider Specialty Primary office phone Ilan Singleton. Fritz Deleon MD Emergency Medicine [phone] Trever Roca MD General Surgery [phone] Your Primary Care Physician (PCP) Primary Care Physician Office Phone Office Fax Marcos Saira [phone] You are allergic to the following No active allergies Recent Documentation Height Weight BMI  
  
  
 1.702 m 71.2 kg 24.58 kg/m2 Emergency Contacts Name Discharge Info Relation Home Work Mobile PanchitoRuth Ann DISCHARGE CAREGIVER [3] Spouse [3] [phone] Patient Belongings The following personal items are in your possession at time of discharge: 
  Dental Appliances: None  Visual Aid: Glasses, With patient Please provide this summary of care documentation to your next provider. Signatures-by signing, you are acknowledging that this After Visit Summary has been reviewed with you and you have received a copy.   
  
 
  
    
    
 Patient Signature: ____________________________________________________________ Date:  ____________________________________________________________  
  
Sigmund [place] Provider Signature:  ____________________________________________________________ Date:  ____________________________________________________________

## 2018-06-11 NOTE — ANESTHESIA PREPROCEDURE EVALUATION
Anesthetic History   No history of anesthetic complications            Review of Systems / Medical History  Patient summary reviewed, nursing notes reviewed and pertinent labs reviewed    Pulmonary  Within defined limits                 Neuro/Psych   Within defined limits           Cardiovascular              Hyperlipidemia    Exercise tolerance: >4 METS     GI/Hepatic/Renal               Comments: CHOLECYSTITIS Endo/Other  Within defined limits           Other Findings   Comments: BPH           Physical Exam    Airway  Mallampati: II    Neck ROM: normal range of motion   Mouth opening: Normal     Cardiovascular  Regular rate and rhythm,  S1 and S2 normal,  no murmur, click, rub, or gallop             Dental    Dentition: Bridges     Pulmonary  Breath sounds clear to auscultation               Abdominal  GI exam deferred       Other Findings            Anesthetic Plan    ASA: 2  Anesthesia type: general    Monitoring Plan: BIS      Induction: Intravenous  Anesthetic plan and risks discussed with: Patient

## 2018-06-11 NOTE — ED PROVIDER NOTES
EMERGENCY DEPARTMENT HISTORY AND PHYSICAL EXAM      Date: 6/11/2018  Patient Name: Ashkan Rai    History of Presenting Illness     Chief Complaint   Patient presents with    Abdominal Pain     Ambulatory w/ wife w/ c/o RLQ abd pain since 2200 last night w/ N/V/D and chills     History Provided By: Patient    HPI: Ashkan Rai, 75 y.o. male with no significant PMHx, presents ambulatory to the ED with cc of gradually worsening abdominal pain alongside nausea, associated emesis, and diarrhea since yesterday near 2200. Pt informs the discomfort presents to the umbilicus and right sided abdomen with a moderate intensity alongside an associated dull, aching sensation to the region. Pt informs the discomfort to the right lower side is greater than the right upper side. Pt informs that the discomfort has been constant since onset and is worsened by oral intake. Pt attempted to relive symptoms with an antacid for the discomfort without alleviation. His symptoms are associated with moderate intensity nausea with two episodes of associated emesis since last night, as well as several episodes of diarrhea which were significantly looser than baseline, albeit not watery. Pt additionally informs of chills. Pt reports no other OTC medications or notable modifying factors for his discomfort. Pt specifically denies any fevers, urinary complications, hematochezia, chest pain, or SOB. Social Hx: - EtOH; - Smoker; - Illicit Drugs    PCP: Jyoti Person MD    There are no other complaints, changes, or physical findings at this time. Current Facility-Administered Medications   Medication Dose Route Frequency Provider Last Rate Last Dose    piperacillin-tazobactam (ZOSYN) 4.5 g in 0.9% sodium chloride (MBP/ADV) 100 mL ADV  4.5 g IntraVENous NOW Adriana Raymond MD         Current Outpatient Prescriptions   Medication Sig Dispense Refill    finasteride (PROSCAR) 5 mg tablet Take 5 mg by mouth daily.       Fenofibrate 120 mg tab Take  by mouth.  tamsulosin (FLOMAX) 0.4 mg capsule Take 0.4 mg by mouth daily.  aspirin 81 mg chewable tablet Take 81 mg by mouth daily.  SIMVASTATIN PO Take  by mouth. Past History     Past Medical History:  No past medical history on file. Past Surgical History:  No past surgical history on file. Family History:  No family history on file. Social History:  Social History   Substance Use Topics    Smoking status: Not on file    Smokeless tobacco: Not on file    Alcohol use Not on file     Allergies:  No Known Allergies  Review of Systems   Review of Systems   Constitutional: Positive for chills. Negative for activity change, appetite change, fever and unexpected weight change. HENT: Negative for congestion. Eyes: Negative for pain and visual disturbance. Respiratory: Negative for cough and shortness of breath. Cardiovascular: Negative for chest pain. Gastrointestinal: Positive for abdominal pain, diarrhea, nausea and vomiting. Negative for blood in stool. Genitourinary: Negative for dysuria. Musculoskeletal: Negative for back pain. Skin: Negative for rash. Neurological: Negative for headaches. Physical Exam   Physical Exam   Constitutional: He is oriented to person, place, and time. He appears well-developed and well-nourished. He appears distressed (mild). Nataliia Pulling appearing elderly male   HENT:   Head: Normocephalic and atraumatic. Mouth/Throat: Oropharynx is clear and moist.   Eyes: Conjunctivae and EOM are normal. Pupils are equal, round, and reactive to light. Right eye exhibits no discharge. Left eye exhibits no discharge. Neck: Normal range of motion. Neck supple. Cardiovascular: Normal rate, regular rhythm and normal heart sounds. No murmur heard. Pulmonary/Chest: Effort normal and breath sounds normal. No respiratory distress. He has no wheezes. He has no rales. Abdominal: Soft.  Bowel sounds are normal. He exhibits no distension. There is no tenderness. There is no rigidity, no guarding, no tenderness at McBurney's point and negative Hawk's sign. Negative Rovsing's    Musculoskeletal: Normal range of motion. He exhibits no edema. Neurological: He is alert and oriented to person, place, and time. No cranial nerve deficit. He exhibits normal muscle tone. Skin: Skin is warm and dry. No rash noted. He is not diaphoretic. Nursing note and vitals reviewed. Diagnostic Study Results   Labs -     Recent Results (from the past 12 hour(s))   URINALYSIS W/ REFLEX CULTURE    Collection Time: 06/11/18  1:48 PM   Result Value Ref Range    Color YELLOW/STRAW      Appearance CLEAR CLEAR      Specific gravity 1.025 1.003 - 1.030      pH (UA) 5.0 5.0 - 8.0      Protein 30 (A) NEG mg/dL    Glucose NEGATIVE  NEG mg/dL    Ketone NEGATIVE  NEG mg/dL    Bilirubin NEGATIVE  NEG      Blood NEGATIVE  NEG      Urobilinogen 0.2 0.2 - 1.0 EU/dL    Nitrites NEGATIVE  NEG      Leukocyte Esterase NEGATIVE  NEG      WBC 0-4 0 - 4 /hpf    RBC 5-10 0 - 5 /hpf    Epithelial cells FEW FEW /lpf    Bacteria NEGATIVE  NEG /hpf    UA:UC IF INDICATED CULTURE NOT INDICATED BY UA RESULT CNI      Hyaline cast 0-2 0 - 5 /lpf   CBC WITH AUTOMATED DIFF    Collection Time: 06/11/18  2:07 PM   Result Value Ref Range    WBC 8.7 4.1 - 11.1 K/uL    RBC 4.98 4.10 - 5.70 M/uL    HGB 13.9 12.1 - 17.0 g/dL    HCT 42.3 36.6 - 50.3 %    MCV 84.9 80.0 - 99.0 FL    MCH 27.9 26.0 - 34.0 PG    MCHC 32.9 30.0 - 36.5 g/dL    RDW 15.3 (H) 11.5 - 14.5 %    PLATELET 488 484 - 377 K/uL    MPV 9.5 8.9 - 12.9 FL    NRBC 0.0 0  WBC    ABSOLUTE NRBC 0.00 0.00 - 0.01 K/uL    NEUTROPHILS 86 (H) 32 - 75 %    LYMPHOCYTES 9 (L) 12 - 49 %    MONOCYTES 4 (L) 5 - 13 %    EOSINOPHILS 0 0 - 7 %    BASOPHILS 0 0 - 1 %    IMMATURE GRANULOCYTES 0 0.0 - 0.5 %    ABS. NEUTROPHILS 7.5 1.8 - 8.0 K/UL    ABS. LYMPHOCYTES 0.8 0.8 - 3.5 K/UL    ABS. MONOCYTES 0.3 0.0 - 1.0 K/UL    ABS.  EOSINOPHILS 0.0 0.0 - 0.4 K/UL    ABS. BASOPHILS 0.0 0.0 - 0.1 K/UL    ABS. IMM. GRANS. 0.0 0.00 - 0.04 K/UL    DF AUTOMATED     METABOLIC PANEL, COMPREHENSIVE    Collection Time: 06/11/18  2:07 PM   Result Value Ref Range    Sodium 143 136 - 145 mmol/L    Potassium 3.8 3.5 - 5.1 mmol/L    Chloride 108 97 - 108 mmol/L    CO2 26 21 - 32 mmol/L    Anion gap 9 5 - 15 mmol/L    Glucose 119 (H) 65 - 100 mg/dL    BUN 13 6 - 20 MG/DL    Creatinine 1.21 0.70 - 1.30 MG/DL    BUN/Creatinine ratio 11 (L) 12 - 20      GFR est AA >60 >60 ml/min/1.73m2    GFR est non-AA 60 (L) >60 ml/min/1.73m2    Calcium 9.5 8.5 - 10.1 MG/DL    Bilirubin, total 0.4 0.2 - 1.0 MG/DL    ALT (SGPT) 34 12 - 78 U/L    AST (SGOT) 22 15 - 37 U/L    Alk. phosphatase 51 45 - 117 U/L    Protein, total 8.4 (H) 6.4 - 8.2 g/dL    Albumin 4.4 3.5 - 5.0 g/dL    Globulin 4.0 2.0 - 4.0 g/dL    A-G Ratio 1.1 1.1 - 2.2     LIPASE    Collection Time: 06/11/18  2:07 PM   Result Value Ref Range    Lipase 490 (H) 73 - 393 U/L     Radiologic Studies -   US ABD LTD   Final Result      CT ABD PELV W CONT   Final Result        CT Results  (Last 48 hours)               06/11/18 1606  CT ABD PELV W CONT Final result    Impression:  IMPRESSION:       1. No acute findings. 2. Cholelithiasis. 3. Enlarged prostate. Narrative:  EXAM:  CT ABD PELV W CONT       INDICATION: Abdominal pain, RLQ       COMPARISON: 2013       CONTRAST:  100 mL of Isovue-370. TECHNIQUE:    Following the uneventful intravenous administration of contrast, thin axial   images were obtained through the abdomen and pelvis. Coronal and sagittal   reconstructions were generated. Oral contrast was not administered. CT dose   reduction was achieved through use of a standardized protocol tailored for this   examination and automatic exposure control for dose modulation. FINDINGS:    LUNG BASES: Mild hypoventilatory change at the lung bases. INCIDENTALLY IMAGED HEART AND MEDIASTINUM: Unremarkable. LIVER: No mass or biliary dilatation. GALLBLADDER: Cholelithiasis. SPLEEN: No mass. PANCREAS: No mass or ductal dilatation. ADRENALS: Unremarkable. KIDNEYS: Right renal cyst.   STOMACH: Unremarkable. SMALL BOWEL: No dilatation or wall thickening. COLON: No dilatation or wall thickening. APPENDIX: Unremarkable. PERITONEUM: No ascites or pneumoperitoneum. RETROPERITONEUM: No lymphadenopathy or aortic aneurysm. REPRODUCTIVE ORGANS: Enlarged prostate. URINARY BLADDER: No mass or calculus. BONES: No destructive bone lesion. ADDITIONAL COMMENTS: N/A               INDICATION:  Abdominal pain; Cholelithiasis      EXAM: US Abdomen Limited.     FINDINGS: Abdomen sonogram of the right upper quadrant is performed.      The gallbladder contains stones, with slight wall thickening, 5 mm, without  tenderness. There is no pericholecystic fluid. The bile ducts are not enlarged.      Liver demonstrates normal, uniform echotexture. Pancreatic head appears normal.  Right kidney appears normal other than a small simple cyst. There is no free  fluid in the hepatorenal fossa.     IMPRESSION  IMPRESSION: Cholelithiasis. Borderline thick gallbladder wall. No biliary  dilation. Medical Decision Making   I am the first provider for this patient. I reviewed the vital signs, available nursing notes, past medical history, past surgical history, family history and social history. Vital Signs-Reviewed the patient's vital signs. Patient Vitals for the past 12 hrs:   Temp Pulse Resp BP SpO2   06/11/18 1500 - - - 166/86 -   06/11/18 1329 99.1 °F (37.3 °C) (!) 50 15 189/71 100 %     Pulse Oximetry Analysis - 100% on RA    Cardiac Monitor:   Rate: 50 bpm  Rhythm: Sinus Bradycardia      Records Reviewed: Nursing Notes and Old Medical Records    Provider Notes (Medical Decision Making):   Pt complaining of pain around the umbilicus without tenderness. Vitals WNL.  Low suspicion for biliary colic, ACS, dissection, colitis, diverticulitis. ED Course:   Initial assessment performed. The patients presenting problems have been discussed, and they are in agreement with the care plan formulated and outlined with them. I have encouraged them to ask questions as they arise throughout their visit. Progress Note:   4:30 PM  Pt informs his abdominal pain is persistent alongside the nausea. Updated pt on all returned results and findings including chololithiasis on CT and need for US. Pt in agreement with the further progression of care plan and expresses agreement with and understanding of all items discussed. Written by KALPANA Emanuel, as dictated by Lb Morataya MD.     Progress Note:   5:53 PM  Updated pt on all returned results and findings including pending general surgery consult and likely admission. Pt in agreement with the further progression of care plan and expresses agreement with and understanding of all items discussed. Written by KALPANA Emanuel, as dictated by Lb Morataya MD.     CONSULT NOTE:   5:55 PM  Lb Morataya MD spoke with Dr. Antonio Anne,  Specialty: General Surgery  Discussed pt's hx, disposition, and available diagnostic and imaging results. Reviewed care plans. Consultant agrees with plans as outlined. Will come evaluate the pt. Written by KALPANA Emanuel, as dictated by Lb Morataya MD.    Disposition:  ADMIT NOTE:    5:57 PM  Patient is being admitted to the hospital by Dr. Antonio Anne. The results of their tests and reasons for their admission have been discussed with the patient and/or available family. They convey agreement and understanding for the need to be admitted and for the admission diagnosis. PLAN:  1. Admit to General Surgery, Dr. Antonio Anne    Diagnosis     Clinical Impression:   1. Calculus of gallbladder with acute cholecystitis without obstruction    2. Elevated lipase        Attestations:   This note is prepared by Leny Felix, acting as Scribe for Dania Fisher MD.    Dania Fisher MD: The scribe's documentation has been prepared under my direction and personally reviewed by me in its entirety. I confirm that the note above accurately reflects all work, treatment, procedures, and medical decision making performed by me. This note will not be viewable in [address]

## 2018-06-12 VITALS
HEIGHT: 67 IN | RESPIRATION RATE: 14 BRPM | HEART RATE: 51 BPM | BODY MASS INDEX: 24.64 KG/M2 | OXYGEN SATURATION: 98 % | WEIGHT: 156.97 LBS | SYSTOLIC BLOOD PRESSURE: 120 MMHG | DIASTOLIC BLOOD PRESSURE: 70 MMHG | TEMPERATURE: 97.7 F

## 2018-06-12 PROCEDURE — 74011250636 HC RX REV CODE- 250/636: Performed by: SURGERY

## 2018-06-12 PROCEDURE — 99218 HC RM OBSERVATION: CPT

## 2018-06-12 RX ORDER — HYDROCODONE BITARTRATE AND ACETAMINOPHEN 5; 325 MG/1; MG/1
1 TABLET ORAL
Qty: 30 TAB | Refills: 0 | Status: ON HOLD | OUTPATIENT
Start: 2018-06-12 | End: 2019-01-18 | Stop reason: SDUPTHER

## 2018-06-12 RX ADMIN — Medication 10 ML: at 05:11

## 2018-06-12 RX ADMIN — DEXTROSE, SODIUM CHLORIDE, AND POTASSIUM CHLORIDE 125 ML/HR: 5; .45; .15 INJECTION INTRAVENOUS at 05:09

## 2018-06-12 NOTE — PERIOP NOTES
TRANSFER - IN REPORT:    Verbal report received from CARLA Tomlin RN and Rika Walter CRNA (name) on Elizabeth Hamilton  being received from OR (unit) for routine post - op      Report consisted of patients Situation, Background, Assessment and   Recommendations(SBAR). Information from the following report(s) OR Summary was reviewed with the receiving nurse. Opportunity for questions and clarification was provided. Assessment completed upon patients arrival to unit and care assumed.

## 2018-06-12 NOTE — OP NOTES
OPERATION        DATE OF OPERATION:  6/11/2018    PREOPERATIVE DIAGNOSIS:  Cholecystitis with cholelithiasis    POSTOPERATIVE DIAGNOSIS:  Acute cholecystitis in setting of hydrops gallbladder    PROCEDURE:   Laparoscopic Cholecystectomy    SURGEON:  Austin Lion MD, FACS. ANESTHESIA:  General Endotracheal Anesthesia    FINDINGS:  Cholecystitis with cholelithiasis    SPECIMENS REMOVED:  Gallbladder and Contents    DESCRIPTION OF OPERATION:  After appropriate consent was obtained, patient who was competent was brought to the operating room, made comfortable in a supine position, and administered general endotracheal anesthesia. The patient was prepped and draped in standard fashion. A 0.5% plain Marcaine solution was used to infiltrate the skin at the trocar sites. Fifteen blade was then used to incise of the umbilicus. This was extended sharply down through the midline fascia. A Yang trocar was placed and the abdominal cavity was insufflated with CO2 gas to 15 cm water pressure. Under direct visualization, two 5 mm trocars were placed in the upper abdomen. Using standard laparoscopic technique, the abdominal cavity was explored. The gallbladder was identified and found to be tense. Clear mucinous fluid was aspirated and it was grasped at the fundus and elevated over the inferior edge of the liver. The neck of the gallbladder was retracted laterally. The peritoneum was stripped down over Calot's triangle and the cystic duct and cystic artery were identified. These structures were freed up proximally and distally, ligated with clips, and divided between clips. The gallbladder was then reflected off the gallbladder fossa with the Bovie hook electrocautery and once it was free, it was placed in the endoscopic retrieval bag and withdrawn from the abdomen through the umbilical trocar site. The gallbladder fossa was inspected. Hemostasis was confirmed.  The trocars were removed under direct visualization with no evidence of bleeding. CO2 gas was fully desufflated from the abdominal cavity. The umbilical fascia defect was approximated with 0 Vicryl suture. Skin incisions were closed with 5-0 Monocryl subcuticular stitch. The wounds were cleaned and dried and dressed with Dermabond and the patient was awakened and extubated and transferred to the recovery room in good condition. There were no complications and minimal blood loss during the case. Remy Henderson.  Edwin Zayas MD, [facility]

## 2018-06-12 NOTE — DISCHARGE INSTRUCTIONS
Cholecystectomy: What to Expect at [address] [place] [address]  After your surgery, it is normal to feel weak and tired for several days after you return home. Your belly may be swollen. If you had laparoscopic surgery, you may also have pain in your shoulder for about 24 hours. You may have gas or need to burp a lot at first, and a few people get diarrhea. The diarrhea usually goes away in 2 to 4 weeks, but it may last longer. How quickly you recover depends on whether you had a laparoscopic or open surgery. · For a laparoscopic surgery, most people can go back to work or their normal routine in 1 to 2 weeks, but it may take longer, depending on the type of work you do. · For an open surgery, it will probably take 4 to 6 weeks before you get back to your normal routine. This care sheet gives you a general idea about how long it will take for you to recover. However, each person recovers at a different pace. Follow the steps below to get better as quickly as possible. How can you care for yourself at home? Activity  · Rest when you feel tired. Getting enough sleep will help you recover. · Try to walk each day. Start out by walking a little more than you did the day before. Gradually increase the amount you walk. Walking boosts blood flow and helps prevent pneumonia and constipation. · For about 2 to 4 weeks, avoid lifting anything that would make you strain. This may include a child, heavy grocery bags and milk containers, a heavy briefcase or backpack, cat litter or dog food bags, or a vacuum . · Avoid strenuous activities, such as biking, jogging, weightlifting, and aerobic exercise, until your doctor says it is okay. · You may shower 24 to 48 hours after surgery, if your doctor okays it. Pat the cut (incision) dry. Do not take a bath for the first 2 weeks, or until your doctor tells you it is okay.   · You may drive when you are no longer taking pain medicine and can quickly move your foot from the gas pedal to the brake. You must also be able to sit comfortably for a long period of time, even if you do not plan to go far. You might get caught in traffic. · For a laparoscopic surgery, most people can go back to work or their normal routine in 1 to 2 weeks, but it may take longer. For an open surgery, it will probably take 4 to 6 weeks before you get back to your normal routine. · Your doctor will tell you when you can have sex again. Diet  · Eat smaller meals more often instead of fewer larger meals. You can eat a normal diet, but avoid eating fatty foods for about 1 month. Fatty foods include hamburger, whole milk, cheese, and many snack foods. If your stomach is upset, try bland, low-fat foods like plain rice, broiled chicken, toast, and yogurt. · Drink plenty of fluids (unless your doctor tells you not to). · If you have diarrhea, try avoiding spicy foods, dairy products, fatty foods, and alcohol. You can also watch to see if specific foods cause it, and stop eating them. If the diarrhea continues for more than 2 weeks, talk to your doctor. · You may notice that your bowel movements are not regular right after your surgery. This is common. Try to avoid constipation and straining with bowel movements. You may want to take a fiber supplement every day. If you have not had a bowel movement after a couple of days, ask your doctor about taking a mild laxative. Medicines  · Take pain medicines exactly as directed. ¨ If the doctor gave you a prescription medicine for pain, take it as prescribed. ¨ If you are not taking a prescription pain medicine, take an over-the-counter medicine such as acetaminophen (Tylenol), ibuprofen (Advil, Motrin), or naproxen (Aleve). Read and follow all instructions on the label. ¨ Do not take two or more pain medicines at the same time unless the doctor told you to. Many pain medicines contain acetaminophen, which is Tylenol. Too much Tylenol can be harmful.   · If you think your pain medicine is making you sick to your stomach:  ¨ Take your medicine after meals (unless your doctor tells you not to). ¨ Ask your doctor for a different pain medicine. · If your doctor prescribed antibiotics, take them as directed. Do not stop taking them just because you feel better. You need to take the full course of antibiotics. Incision care  · If you have strips of tape on the incision, or cut, leave the tape on for a week or until it falls off. · After 24 to 48 hours, wash the area daily with warm, soapy water, and pat it dry. · You may have staples to hold the cut together. Keep them dry until your doctor takes them out. This is usually in 7 to 10 days. · Keep the area clean and dry. You may cover it with a gauze bandage if it weeps or rubs against clothing. Change the bandage every day. Ice   · To reduce swelling and pain, put ice or a cold pack on your belly for 10 to 20 minutes at a time. Do this every 1 to 2 hours. Put a thin cloth between the ice and your skin. Follow-up care is a key part of your treatment and safety. Be sure to make and go to all appointments, and call your doctor if you are having problems. Its also a good idea to know your test results and keep a list of the medicines you take. When should you call for help? Call 911 anytime you think you may need emergency care. For example, call if:  · You passed out (lost consciousness). · You have severe trouble breathing. · You have sudden chest pain and shortness of breath, or you cough up blood. Call your doctor now or seek immediate medical care if:  · You are sick to your stomach and cannot drink fluids. · You have pain that does not get better when you take your pain medicine. · You have signs of infection, such as:  ¨ Increased pain, swelling, warmth, or redness. ¨ Red streaks leading from the incision. ¨ Pus draining from the incision. ¨ Swollen lymph nodes in your neck, armpits, or groin. ¨ A fever.   · Your urine turns dark brown or your stool is light-colored or surya-colored. · Your skin or the whites of your eyes turn yellow. · Bright red blood has soaked through a large bandage over your incision. · You have signs of a blood clot, such as:  ¨ Pain in your calf, back of knee, thigh, or groin. ¨ Redness and swelling in your leg or groin. · You have trouble passing urine or stool, especially if you have mild pain or swelling in your lower belly. Watch closely for any changes in your health, and be sure to contact your doctor if:  · You had a laparoscopic surgery and your shoulder pain lasts more than 24 hours. · You do not have a bowel movement after taking a laxative. Where can you learn more? Go to [facility]  Enter F357 in the search box to learn more about \"Cholecystectomy: What to Expect at Home. \"   © 0607-9128 [employer]wise, [employer]. Care instructions adapted under license by [employer] (which disclaims liability or warranty for this information). This care instruction is for use with your licensed healthcare professional. If you have questions about a medical condition or this instruction, always ask your healthcare professional. [place] [address] any warranty or liability for your use of this information.   Content Version: 64.3.768335; Current as of: November 14, 2014

## 2018-06-12 NOTE — DISCHARGE SUMMARY
Physician Discharge Summary     Patient ID:  Anthony Benjamin  [id number]  41 y.o.  1950    Admit Date: 6/11/2018    Discharge Date: 6/12/2018    Admission Diagnoses: CHOLECYTITIS;Acute calculous cholecystitis    Discharge Diagnoses:  Principal Problem:    Acute calculous cholecystitis (6/11/2018)         Admission Condition: Poor    Discharge Condition: Good    Last Procedure: Procedure(s):  [facility] Course:   Normal hospital course for this procedure. Consults: None    Disposition: home    Patient Instructions:   Current Discharge Medication List      START taking these medications    Details   HYDROcodone-acetaminophen (NORCO) 5-325 mg per tablet Take 1 Tab by mouth every four (4) hours as needed. Max Daily Amount: 6 Tabs. Qty: 30 Tab, Refills: 0    Associated Diagnoses: Acute calculous cholecystitis         CONTINUE these medications which have NOT CHANGED    Details   finasteride (PROSCAR) 5 mg tablet Take 5 mg by mouth daily. Fenofibrate 120 mg tab Take  by mouth. tamsulosin (FLOMAX) 0.4 mg capsule Take 0.4 mg by mouth daily. aspirin 81 mg chewable tablet Take 81 mg by mouth daily. SIMVASTATIN PO Take  by mouth. Activity: No driving while on analgesics and No heavy lifting for 4 weeks  Diet: Low fat, Low cholesterol  Wound Care: Keep wound clean and dry    Follow-up with Dr. Diallo Hansen in 2 weeks.   Follow-up tests/labs none    Signed:  Donna Cassidy MD  [facility]  6/12/2018  8:55 AM

## 2018-06-12 NOTE — ROUTINE PROCESS
PCP CRISTELA appt scheduled with Dr. Thang Perkins on 6/20/2018 at 3:45pm. Appt added to [address] [facility], [address] [facility]

## 2018-06-12 NOTE — PERIOP NOTES
TRANSFER - OUT REPORT:    Verbal report given to Fernanda Edouard RN (name) on Jacqueline Zaragoza  being transferred to 2133 (unit) for routine progression of care       Report consisted of patients Situation, Background, Assessment and   Recommendations(SBAR). Information from the following report(s) OR Summary, Intake/Output and MAR was reviewed with the receiving nurse. Opportunity for questions and clarification was provided.       Patient transported with:   O2 @ 2 liters   RN

## 2018-06-12 NOTE — ANESTHESIA POSTPROCEDURE EVALUATION
Post-Anesthesia Evaluation and Assessment    Patient: Norman Ewing MRN: [id number]  SSN: [id number]    YOB: 1950  Age: 75 y.o. Sex: male       Cardiovascular Function/Vital Signs  Visit Vitals    /71    Pulse (!) 49    Temp 37.1 °C (98.8 °F)    Resp 13    Ht 5' 7\" (1.702 m)    Wt 71.2 kg (156 lb 15.5 oz)    SpO2 100%    BMI 24.58 kg/m2       Patient is status post general anesthesia for Procedure(s):  CHOLECYSTECTOMY LAPAROSCOPIC. Nausea/Vomiting: None    Postoperative hydration reviewed and adequate. Pain:  Pain Scale 1: Numeric (0 - 10) (06/11/18 2143)  Pain Intensity 1: 0 (06/11/18 2143)   Managed    Neurological Status:   Neuro (WDL): Exceptions to WDL (06/11/18 2143)  Neuro  Neurologic State: Drowsy; Eyes open to voice; Eyes open to stimulus;Sleeping (06/11/18 2143)  Orientation Level: Oriented to place;Oriented to person;Oriented to situation (06/11/18 2143)  Cognition: Follows commands (06/11/18 2143)  Speech: Clear (06/11/18 2143)  LUE Motor Response: Purposeful (06/11/18 2143)  LLE Motor Response: Purposeful (06/11/18 2143)  RUE Motor Response: Purposeful (06/11/18 2143)  RLE Motor Response: Purposeful (06/11/18 2143)   At baseline    Mental Status and Level of Consciousness: Arousable    Pulmonary Status:   O2 Device: Nasal cannula (06/11/18 2143)   Adequate oxygenation and airway patent    Complications related to anesthesia: None    Post-anesthesia assessment completed.  No concerns    Signed By: Regan Cheung MD     June 11, 2018

## 2018-06-12 NOTE — PROGRESS NOTES
General Surgery End of Shift Nursing Note    Bedside shift change report given to Reba Rodriguez (oncoming nurse) by Esdras Chapin RN (offgoing nurse). Report included the following information SBAR, Kardex, Intake/Output, MAR and Recent Results. Shift worked:   7p-7a   Summary of shift:    Pt slept most of the shift. Offered no c/o. Admission assessment completed. Issues for physician to address:   N/A     Number times ambulated in hallway past shift: 0    Number of times OOB to chair past shift: 1    Pain Management:  Current medication: Norco  Patient states pain is manageable on current pain medication: No c/o pain. GI:    Current diet:  DIET CLEAR LIQUID    Tolerating current diet: YES  Passing flatus: NO  Last Bowel Movement: yesterday    Respiratory:    Incentive Spirometer at bedside: YES  Patient instructed on use: YES    Patient Safety:    Falls Score: 1  Bed Alarm On? No  Sitter?  No    Margret Mendoza RN

## 2018-06-29 ENCOUNTER — OFFICE VISIT (OUTPATIENT)
Dept: SURGERY | Age: 68
End: 2018-06-29

## 2018-06-29 VITALS
HEIGHT: 67 IN | BODY MASS INDEX: 24.33 KG/M2 | RESPIRATION RATE: 20 BRPM | SYSTOLIC BLOOD PRESSURE: 124 MMHG | TEMPERATURE: 97.7 F | DIASTOLIC BLOOD PRESSURE: 72 MMHG | OXYGEN SATURATION: 100 % | HEART RATE: 53 BPM | WEIGHT: 155 LBS

## 2018-06-29 DIAGNOSIS — K80.00 ACUTE CALCULOUS CHOLECYSTITIS: Primary | ICD-10-CM

## 2018-06-29 NOTE — PROGRESS NOTES
SUBJECTIVE: Jacqueline Zaragoza is a 75 y.o. male is seen for a routine postop check s/p lap cholecystectomy  Reports no problems with the wound or other issues. Activity, diet and bowels are normal. No pain. OBJECTIVE: Appears well. Wounds are well healed without complications or infection. ASSESSMENT: normal postoperative course, doing well. PLAN: Patient is instructed to avoid heavy lifting for 2 more weeks. Return PRN for any problems or concerns.

## 2018-06-29 NOTE — MR AVS SNAPSHOT
[address] [facility] 
[phone] Patient: Sahra Black MRN: [id number] GQX:[id number] Visit Information Date & Time Provider Department Dept. Phone Encounter #  
 6/29/2018  9:10 AM Patrice Lai MD [facility] [phone] [id number] Upcoming Health Maintenance Date Due Hepatitis C Screening 1950 DTaP/Tdap/Td series (1 - Tdap) 9/14/1970 FOBT Q 1 YEAR AGE 50-75 9/14/1999 ZOSTER VACCINE AGE 60> 7/14/2009 GLAUCOMA SCREENING Q2Y 9/14/2014 Pneumococcal 65+ Low/Medium Risk (1 of 2 - PCV13) 9/14/2014 MEDICARE YEARLY EXAM 6/11/2018 Influenza Age 5 to Adult 8/1/2018 Allergies as of 6/29/2018  Review Complete On: 6/29/2018 By: Ibrahima Martinez LPN No Known Allergies Current Immunizations  Reviewed on 6/11/2018 No immunizations on file. Not reviewed this visit Vitals BP Pulse Temp Resp Height(growth percentile) Weight(growth percentile) 124/72 (BP 1 Location: Left arm, BP Patient Position: Sitting) (!) 53 97.7 °F (36.5 °C) (Oral) 20 5' 7\" (1.702 m) 155 lb (70.3 kg) SpO2 BMI Smoking Status 100% 24.28 kg/m2 Former Smoker BMI and BSA Data Body Mass Index Body Surface Area  
 24.28 kg/m 2 1.82 m 2 Preferred Pharmacy Pharmacy Name Phone [id number] [phone] Your Updated Medication List  
  
   
This list is accurate as of 6/29/18  9:27 AM.  Always use your most recent med list.  
  
  
  
  
 aspirin 81 mg chewable tablet Take 81 mg by mouth daily. Fenofibrate 120 mg Tab Take  by mouth. finasteride 5 mg tablet Commonly known as:  PROSCAR Take 5 mg by mouth daily. HYDROcodone-acetaminophen 5-325 mg per tablet Commonly known as:  Paige Ycr  
 Take 1 Tab by mouth every four (4) hours as needed. Max Daily Amount: 6 Tabs. SIMVASTATIN PO Take  by mouth. tamsulosin 0.4 mg capsule Commonly known as:  FLOMAX Take 0.4 mg by mouth daily. Introducing [facility]! [facility] introduces [employer] ([employer] patient portal. Now you can access parts of your medical record, email your doctor's office, and request medication refills online. 1. In your internet browser, go to [email or web address] 2. Click on the First Time User? Click Here link in the Sign In box. You will see the New Member Sign Up page. 3. Enter your [employer] Access Code exactly as it appears below. You will not need to use this code after youve completed the sign-up process. If you do not sign up before the expiration date, you must request a new code. · [employer] Access Code: [email or web address] Expires: 9/9/2018  2:17 PM 
 
4. Enter the last four digits of your Social Security Number (xxxx) and Date of Birth (mm/dd/yyyy) as indicated and click Submit. You will be taken to the next sign-up page. 5. Create a [employer]) ID. This will be your [employer] login ID and cannot be changed, so think of one that is secure and easy to remember. 6. Create a [employer]) password. You can change your password at any time. 7. Enter your Password Reset Question and Answer. This can be used at a later time if you forget your password. 8. Enter your e-mail address. You will receive e-mail notification when new information is available in [address] 9. Click Sign Up. You can now view and download portions of your medical record. 10. Click the Download Summary menu link to download a portable copy of your medical information. If you have questions, please visit the Frequently Asked Questions section of the [employer] website. Remember, [employer] ([employer] is NOT to be used for urgent needs. For medical emergencies, dial 911. Now available from your iPhone and Android! Please provide this summary of care documentation to your next provider. Your primary care clinician is listed as Mick Reyes. If you have any questions after today's visit, please call [phone].

## 2018-06-29 NOTE — PROGRESS NOTES
1. Have you been to the ER, urgent care clinic since your last visit?no  Hospitalized since your last visit?no    2. Have you seen or consulted any other health care providers outside of the [address] [place] [address] since your last visit? No  Includ. pap smears or colon screening. No          Discussed advanced directive. Patient states that he does not have an advanced directive.

## 2019-01-15 ENCOUNTER — HOSPITAL ENCOUNTER (INPATIENT)
Age: 69
LOS: 2 days | Discharge: HOME | DRG: 443 | End: 2019-01-18
Attending: EMERGENCY MEDICINE | Admitting: INTERNAL MEDICINE
Payer: MEDICARE

## 2019-01-15 DIAGNOSIS — R79.89 ELEVATED LFTS: ICD-10-CM

## 2019-01-15 DIAGNOSIS — K80.00 ACUTE CALCULOUS CHOLECYSTITIS: ICD-10-CM

## 2019-01-15 DIAGNOSIS — N50.89 TESTICULAR MASS: ICD-10-CM

## 2019-01-15 DIAGNOSIS — L03.818 CELLULITIS OF OTHER SPECIFIED SITE: ICD-10-CM

## 2019-01-15 DIAGNOSIS — R21 RASH: Primary | ICD-10-CM

## 2019-01-15 DIAGNOSIS — I81 PORTAL VEIN THROMBOSIS: ICD-10-CM

## 2019-01-15 PROCEDURE — 80053 COMPREHEN METABOLIC PANEL: CPT

## 2019-01-15 PROCEDURE — 36415 COLL VENOUS BLD VENIPUNCTURE: CPT

## 2019-01-15 PROCEDURE — 83690 ASSAY OF LIPASE: CPT

## 2019-01-15 PROCEDURE — 85025 COMPLETE CBC W/AUTO DIFF WBC: CPT

## 2019-01-15 PROCEDURE — 99284 EMERGENCY DEPT VISIT MOD MDM: CPT

## 2019-01-15 PROCEDURE — 83880 ASSAY OF NATRIURETIC PEPTIDE: CPT

## 2019-01-16 ENCOUNTER — APPOINTMENT (OUTPATIENT)
Dept: GENERAL RADIOLOGY | Age: 69
DRG: 443 | End: 2019-01-16
Attending: EMERGENCY MEDICINE
Payer: MEDICARE

## 2019-01-16 ENCOUNTER — APPOINTMENT (OUTPATIENT)
Dept: ULTRASOUND IMAGING | Age: 69
DRG: 443 | End: 2019-01-16
Attending: EMERGENCY MEDICINE
Payer: MEDICARE

## 2019-01-16 ENCOUNTER — APPOINTMENT (OUTPATIENT)
Dept: CT IMAGING | Age: 69
DRG: 443 | End: 2019-01-16
Attending: EMERGENCY MEDICINE
Payer: MEDICARE

## 2019-01-16 PROBLEM — I81 PORTAL VEIN THROMBOSIS: Status: ACTIVE | Noted: 2019-01-16

## 2019-01-16 PROBLEM — N50.89 TESTICULAR MASS: Status: ACTIVE | Noted: 2019-01-16

## 2019-01-16 LAB
ALBUMIN SERPL-MCNC: 3.2 G/DL (ref 3.5–5)
ALBUMIN/GLOB SERPL: 0.8 {RATIO} (ref 1.1–2.2)
ALP SERPL-CCNC: 141 U/L (ref 45–117)
ALT SERPL-CCNC: 473 U/L (ref 12–78)
ANION GAP SERPL CALC-SCNC: 7 MMOL/L (ref 5–15)
APPEARANCE UR: CLEAR
APTT PPP: 30.2 SEC (ref 22.1–32)
APTT PPP: 35.5 SEC (ref 22.1–32)
APTT PPP: 36.7 SEC (ref 22.1–32)
AST SERPL-CCNC: 190 U/L (ref 15–37)
BASOPHILS # BLD: 0 K/UL (ref 0–0.1)
BASOPHILS # BLD: 0 K/UL (ref 0–0.1)
BASOPHILS NFR BLD: 0 % (ref 0–1)
BASOPHILS NFR BLD: 0 % (ref 0–1)
BILIRUB SERPL-MCNC: 2.3 MG/DL (ref 0.2–1)
BILIRUB UR QL CFM: POSITIVE
BNP SERPL-MCNC: 19 PG/ML (ref 0–125)
BUN SERPL-MCNC: 15 MG/DL (ref 6–20)
BUN/CREAT SERPL: 13 (ref 12–20)
CALCIUM SERPL-MCNC: 8.7 MG/DL (ref 8.5–10.1)
CHLORIDE SERPL-SCNC: 105 MMOL/L (ref 97–108)
CO2 SERPL-SCNC: 27 MMOL/L (ref 21–32)
COLOR UR: NORMAL
CREAT SERPL-MCNC: 1.16 MG/DL (ref 0.7–1.3)
DIFFERENTIAL METHOD BLD: ABNORMAL
DIFFERENTIAL METHOD BLD: ABNORMAL
EOSINOPHIL # BLD: 0.2 K/UL (ref 0–0.4)
EOSINOPHIL # BLD: 0.2 K/UL (ref 0–0.4)
EOSINOPHIL NFR BLD: 3 % (ref 0–7)
EOSINOPHIL NFR BLD: 3 % (ref 0–7)
ERYTHROCYTE [DISTWIDTH] IN BLOOD BY AUTOMATED COUNT: 15.1 % (ref 11.5–14.5)
ERYTHROCYTE [DISTWIDTH] IN BLOOD BY AUTOMATED COUNT: 15.2 % (ref 11.5–14.5)
GLOBULIN SER CALC-MCNC: 4 G/DL (ref 2–4)
GLUCOSE SERPL-MCNC: 88 MG/DL (ref 65–100)
GLUCOSE UR STRIP.AUTO-MCNC: NEGATIVE MG/DL
HAV IGM SER QL: NONREACTIVE
HBV CORE IGM SER QL: NONREACTIVE
HBV SURFACE AG SER QL: <0.1 INDEX
HBV SURFACE AG SER QL: NEGATIVE
HCT VFR BLD AUTO: 35.4 % (ref 36.6–50.3)
HCT VFR BLD AUTO: 38.7 % (ref 36.6–50.3)
HCV AB SERPL QL IA: NONREACTIVE
HCV COMMENT,HCGAC: NORMAL
HETEROPH AB SER QL: NEGATIVE
HGB BLD-MCNC: 12 G/DL (ref 12.1–17)
HGB BLD-MCNC: 12.9 G/DL (ref 12.1–17)
HGB UR QL STRIP: NEGATIVE
IMM GRANULOCYTES # BLD AUTO: 0 K/UL (ref 0–0.04)
IMM GRANULOCYTES # BLD AUTO: 0 K/UL (ref 0–0.04)
IMM GRANULOCYTES NFR BLD AUTO: 0 % (ref 0–0.5)
IMM GRANULOCYTES NFR BLD AUTO: 0 % (ref 0–0.5)
INR PPP: 1 (ref 0.9–1.1)
KETONES UR QL STRIP.AUTO: NEGATIVE MG/DL
LACTATE SERPL-SCNC: 0.5 MMOL/L (ref 0.4–2)
LEUKOCYTE ESTERASE UR QL STRIP.AUTO: NEGATIVE
LIPASE SERPL-CCNC: 160 U/L (ref 73–393)
LYMPHOCYTES # BLD: 1.1 K/UL (ref 0.8–3.5)
LYMPHOCYTES # BLD: 1.5 K/UL (ref 0.8–3.5)
LYMPHOCYTES NFR BLD: 21 % (ref 12–49)
LYMPHOCYTES NFR BLD: 26 % (ref 12–49)
MCH RBC QN AUTO: 28.5 PG (ref 26–34)
MCH RBC QN AUTO: 28.6 PG (ref 26–34)
MCHC RBC AUTO-ENTMCNC: 33.3 G/DL (ref 30–36.5)
MCHC RBC AUTO-ENTMCNC: 33.9 G/DL (ref 30–36.5)
MCV RBC AUTO: 84.5 FL (ref 80–99)
MCV RBC AUTO: 85.6 FL (ref 80–99)
MONOCYTES # BLD: 0.4 K/UL (ref 0–1)
MONOCYTES # BLD: 0.6 K/UL (ref 0–1)
MONOCYTES NFR BLD: 11 % (ref 5–13)
MONOCYTES NFR BLD: 8 % (ref 5–13)
NEUTS SEG # BLD: 3.4 K/UL (ref 1.8–8)
NEUTS SEG # BLD: 3.7 K/UL (ref 1.8–8)
NEUTS SEG NFR BLD: 60 % (ref 32–75)
NEUTS SEG NFR BLD: 68 % (ref 32–75)
NITRITE UR QL STRIP.AUTO: NEGATIVE
NRBC # BLD: 0 K/UL (ref 0–0.01)
NRBC # BLD: 0 K/UL (ref 0–0.01)
NRBC BLD-RTO: 0 PER 100 WBC
NRBC BLD-RTO: 0 PER 100 WBC
PH UR STRIP: 5.5 [PH] (ref 5–8)
PLATELET # BLD AUTO: 248 K/UL (ref 150–400)
PLATELET # BLD AUTO: 267 K/UL (ref 150–400)
PMV BLD AUTO: 10 FL (ref 8.9–12.9)
PMV BLD AUTO: 9.6 FL (ref 8.9–12.9)
POTASSIUM SERPL-SCNC: 4.1 MMOL/L (ref 3.5–5.1)
PROT SERPL-MCNC: 7.2 G/DL (ref 6.4–8.2)
PROT UR STRIP-MCNC: NEGATIVE MG/DL
PROTHROMBIN TIME: 10.7 SEC (ref 9–11.1)
RBC # BLD AUTO: 4.19 M/UL (ref 4.1–5.7)
RBC # BLD AUTO: 4.52 M/UL (ref 4.1–5.7)
SODIUM SERPL-SCNC: 139 MMOL/L (ref 136–145)
SP GR UR REFRACTOMETRY: 1.02 (ref 1–1.03)
SP1: NORMAL
SP2: NORMAL
SP3: NORMAL
THERAPEUTIC RANGE,PTTT: ABNORMAL SECS (ref 58–77)
THERAPEUTIC RANGE,PTTT: ABNORMAL SECS (ref 58–77)
THERAPEUTIC RANGE,PTTT: NORMAL SECS (ref 58–77)
UROBILINOGEN UR QL STRIP.AUTO: 1 EU/DL (ref 0.2–1)
WBC # BLD AUTO: 5.5 K/UL (ref 4.1–11.1)
WBC # BLD AUTO: 5.7 K/UL (ref 4.1–11.1)

## 2019-01-16 PROCEDURE — 74011250637 HC RX REV CODE- 250/637: Performed by: EMERGENCY MEDICINE

## 2019-01-16 PROCEDURE — 96365 THER/PROPH/DIAG IV INF INIT: CPT

## 2019-01-16 PROCEDURE — 86308 HETEROPHILE ANTIBODY SCREEN: CPT

## 2019-01-16 PROCEDURE — 83605 ASSAY OF LACTIC ACID: CPT

## 2019-01-16 PROCEDURE — 81003 URINALYSIS AUTO W/O SCOPE: CPT

## 2019-01-16 PROCEDURE — 74011250636 HC RX REV CODE- 250/636: Performed by: INTERNAL MEDICINE

## 2019-01-16 PROCEDURE — 74011636320 HC RX REV CODE- 636/320: Performed by: EMERGENCY MEDICINE

## 2019-01-16 PROCEDURE — 71046 X-RAY EXAM CHEST 2 VIEWS: CPT

## 2019-01-16 PROCEDURE — 96372 THER/PROPH/DIAG INJ SC/IM: CPT

## 2019-01-16 PROCEDURE — 74011000258 HC RX REV CODE- 258: Performed by: EMERGENCY MEDICINE

## 2019-01-16 PROCEDURE — 94760 N-INVAS EAR/PLS OXIMETRY 1: CPT

## 2019-01-16 PROCEDURE — 74011250636 HC RX REV CODE- 250/636: Performed by: EMERGENCY MEDICINE

## 2019-01-16 PROCEDURE — 74177 CT ABD & PELVIS W/CONTRAST: CPT

## 2019-01-16 PROCEDURE — 65270000029 HC RM PRIVATE

## 2019-01-16 PROCEDURE — 85730 THROMBOPLASTIN TIME PARTIAL: CPT

## 2019-01-16 PROCEDURE — 85610 PROTHROMBIN TIME: CPT

## 2019-01-16 PROCEDURE — 87040 BLOOD CULTURE FOR BACTERIA: CPT

## 2019-01-16 PROCEDURE — 76705 ECHO EXAM OF ABDOMEN: CPT

## 2019-01-16 PROCEDURE — 71045 X-RAY EXAM CHEST 1 VIEW: CPT

## 2019-01-16 PROCEDURE — 80074 ACUTE HEPATITIS PANEL: CPT

## 2019-01-16 PROCEDURE — 76870 US EXAM SCROTUM: CPT

## 2019-01-16 RX ORDER — DIPHENHYDRAMINE HCL 25 MG
CAPSULE ORAL
Status: DISPENSED
Start: 2019-01-16 | End: 2019-01-16

## 2019-01-16 RX ORDER — HEPARIN SODIUM 10000 [USP'U]/100ML
18-36 INJECTION, SOLUTION INTRAVENOUS
Status: DISCONTINUED | OUTPATIENT
Start: 2019-01-16 | End: 2019-01-17

## 2019-01-16 RX ORDER — HEPARIN SODIUM 10000 [USP'U]/100ML
INJECTION, SOLUTION INTRAVENOUS
Status: DISCONTINUED
Start: 2019-01-16 | End: 2019-01-16

## 2019-01-16 RX ORDER — ENOXAPARIN SODIUM 100 MG/ML
1 INJECTION SUBCUTANEOUS
Status: COMPLETED | OUTPATIENT
Start: 2019-01-16 | End: 2019-01-16

## 2019-01-16 RX ORDER — BISMUTH SUBSALICYLATE 262 MG
1 TABLET,CHEWABLE ORAL DAILY
COMMUNITY

## 2019-01-16 RX ORDER — HEPARIN SODIUM 5000 [USP'U]/ML
80 INJECTION, SOLUTION INTRAVENOUS; SUBCUTANEOUS AS NEEDED
Status: DISCONTINUED | OUTPATIENT
Start: 2019-01-17 | End: 2019-01-17

## 2019-01-16 RX ORDER — SODIUM CHLORIDE 0.9 % (FLUSH) 0.9 %
10 SYRINGE (ML) INJECTION
Status: COMPLETED | OUTPATIENT
Start: 2019-01-16 | End: 2019-01-16

## 2019-01-16 RX ORDER — HEPARIN SODIUM 5000 [USP'U]/ML
40 INJECTION, SOLUTION INTRAVENOUS; SUBCUTANEOUS AS NEEDED
Status: DISCONTINUED | OUTPATIENT
Start: 2019-01-17 | End: 2019-01-17

## 2019-01-16 RX ORDER — SODIUM CHLORIDE 9 MG/ML
50 INJECTION, SOLUTION INTRAVENOUS
Status: COMPLETED | OUTPATIENT
Start: 2019-01-16 | End: 2019-01-16

## 2019-01-16 RX ORDER — DIPHENHYDRAMINE HCL 25 MG
25 CAPSULE ORAL
Status: COMPLETED | OUTPATIENT
Start: 2019-01-16 | End: 2019-01-16

## 2019-01-16 RX ADMIN — DIPHENHYDRAMINE HYDROCHLORIDE 25 MG: 25 CAPSULE ORAL at 01:45

## 2019-01-16 RX ADMIN — HEPARIN SODIUM AND DEXTROSE 22 UNITS/KG/HR: 10000; 5 INJECTION INTRAVENOUS at 23:03

## 2019-01-16 RX ADMIN — CEFTRIAXONE 1 G: 1 INJECTION, POWDER, FOR SOLUTION INTRAMUSCULAR; INTRAVENOUS at 05:22

## 2019-01-16 RX ADMIN — SODIUM CHLORIDE 50 ML/HR: 900 INJECTION, SOLUTION INTRAVENOUS at 04:28

## 2019-01-16 RX ADMIN — IOPAMIDOL 100 ML: 755 INJECTION, SOLUTION INTRAVENOUS at 04:29

## 2019-01-16 RX ADMIN — Medication 10 ML: at 04:29

## 2019-01-16 RX ADMIN — HEPARIN SODIUM AND DEXTROSE 18 UNITS/KG/HR: 10000; 5 INJECTION INTRAVENOUS at 17:55

## 2019-01-16 RX ADMIN — HEPARIN SODIUM 6000 UNITS: 5000 INJECTION INTRAVENOUS; SUBCUTANEOUS at 23:01

## 2019-01-16 RX ADMIN — ENOXAPARIN SODIUM 80 MG: 100 INJECTION SUBCUTANEOUS at 05:24

## 2019-01-16 NOTE — ED PROVIDER NOTES
EMERGENCY DEPARTMENT HISTORY AND PHYSICAL EXAM      Date: 1/15/2019  Patient Name: Yahaira Sams    History of Presenting Illness     Chief Complaint   Patient presents with    Testicle Swelling     bilateral swelling since tonight. denies fluid medications or CHF    Hand Swelling     right hand swelling since saturday       History Provided By: Patient    HPI: Yahaira Sams, 70 y.o. male with PMHx significant for hypercholesterolemia, kidney stones, arm and leg swelling, and enlarged prostate, presents ambulatory to the ED with cc of new onset swelling of his right hand and jaw since Saturday (1/12/19) and scrotum since this evening. Pt also notes chronic arm and leg swelling. Pt notes that he has a PMHx of arm and leg swelling in the past, but notes that he never had hand, jaw, and scrotum swelling. In addition, he notes lower abd pain and lower back pain when he tries to breath in or cough. He notes he take medication for benign prostate enlargement. He notes having kidney stone in the past but denies any chronic kidney problems or chronic liver problems. He denies any blood pressure medicine. He denies consumption of overly salty foods. He specifically denies fever, SOB, and weight change. There are no other complaints, changes, or physical findings at this time. PCP: Mary Jennings MD    No current facility-administered medications on file prior to encounter. Current Outpatient Medications on File Prior to Encounter   Medication Sig Dispense Refill    HYDROcodone-acetaminophen (NORCO) 5-325 mg per tablet Take 1 Tab by mouth every four (4) hours as needed. Max Daily Amount: 6 Tabs. 30 Tab 0    finasteride (PROSCAR) 5 mg tablet Take 5 mg by mouth daily.  Fenofibrate 120 mg tab Take  by mouth.  tamsulosin (FLOMAX) 0.4 mg capsule Take 0.4 mg by mouth daily.  aspirin 81 mg chewable tablet Take 81 mg by mouth daily.  SIMVASTATIN PO Take  by mouth.          Past History Past Medical History:  Past Medical History:   Diagnosis Date    Enlarged prostate     Hypercholesterolemia        Past Surgical History:  Past Surgical History:   Procedure Laterality Date    CARDIAC SURG PROCEDURE UNLIST  2006    stent placement       Family History:  No family history on file. Social History:  Social History     Tobacco Use    Smoking status: Former Smoker     Types: Cigarettes     Last attempt to quit: 1988     Years since quittin.5    Smokeless tobacco: Never Used   Substance Use Topics    Alcohol use: No    Drug use: Yes     Types: Prescription       Allergies:  No Known Allergies      Review of Systems   Review of Systems   Constitutional: Negative for chills, fever and unexpected weight change. HENT: Negative for congestion. Eyes: Negative for visual disturbance. Respiratory: Negative for chest tightness and shortness of breath. Cardiovascular: Positive for leg swelling. Negative for chest pain. Gastrointestinal: Negative for abdominal pain and vomiting. Endocrine: Negative for polyuria. Genitourinary: Negative for dysuria and frequency. Musculoskeletal: Negative for myalgias. Skin: Negative for color change. +hand, arm, jaw, and scrotum swelling   Allergic/Immunologic: Negative for immunocompromised state. Neurological: Negative for numbness. Physical Exam   Physical Exam   Nursing note and vitals reviewed. General appearance: non-toxic, no distress  Eyes: PERRL, EOMI, conjunctiva normal, anicteric sclera  HEENT: mucous membranes moist, oropharynx is clear  Pulmonary: clear to auscultation bilaterally  Cardiac: normal rate and regular rhythm, no murmurs, gallops, or rubs, 2+DP pulses, 2+ radial pulses  Abdomen: soft, nontender, nondistended, bowel sounds present, no CVAT  MSK: trace pre-tibial edema B/L, trace edema to B/L hands  Neuro: Alert, answers questions appropriately  Skin: capillary refill brisk.  Mod swelling in scrotum particularly on the L, testes somewhat enlarged. Isolated maculopapular rash to upper L arm; no hives or vesicles. Diagnostic Study Results     Labs -     Recent Results (from the past 12 hour(s))   CBC WITH AUTOMATED DIFF    Collection Time: 01/15/19 11:54 PM   Result Value Ref Range    WBC 5.7 4.1 - 11.1 K/uL    RBC 4.52 4.10 - 5.70 M/uL    HGB 12.9 12.1 - 17.0 g/dL    HCT 38.7 36.6 - 50.3 %    MCV 85.6 80.0 - 99.0 FL    MCH 28.5 26.0 - 34.0 PG    MCHC 33.3 30.0 - 36.5 g/dL    RDW 15.1 (H) 11.5 - 14.5 %    PLATELET 647 474 - 260 K/uL    MPV 10.0 8.9 - 12.9 FL    NRBC 0.0 0  WBC    ABSOLUTE NRBC 0.00 0.00 - 0.01 K/uL    NEUTROPHILS 60 32 - 75 %    LYMPHOCYTES 26 12 - 49 %    MONOCYTES 11 5 - 13 %    EOSINOPHILS 3 0 - 7 %    BASOPHILS 0 0 - 1 %    IMMATURE GRANULOCYTES 0 0.0 - 0.5 %    ABS. NEUTROPHILS 3.4 1.8 - 8.0 K/UL    ABS. LYMPHOCYTES 1.5 0.8 - 3.5 K/UL    ABS. MONOCYTES 0.6 0.0 - 1.0 K/UL    ABS. EOSINOPHILS 0.2 0.0 - 0.4 K/UL    ABS. BASOPHILS 0.0 0.0 - 0.1 K/UL    ABS. IMM. GRANS. 0.0 0.00 - 0.04 K/UL    DF AUTOMATED     METABOLIC PANEL, COMPREHENSIVE    Collection Time: 01/15/19 11:54 PM   Result Value Ref Range    Sodium 139 136 - 145 mmol/L    Potassium 4.1 3.5 - 5.1 mmol/L    Chloride 105 97 - 108 mmol/L    CO2 27 21 - 32 mmol/L    Anion gap 7 5 - 15 mmol/L    Glucose 88 65 - 100 mg/dL    BUN 15 6 - 20 MG/DL    Creatinine 1.16 0.70 - 1.30 MG/DL    BUN/Creatinine ratio 13 12 - 20      GFR est AA >60 >60 ml/min/1.73m2    GFR est non-AA >60 >60 ml/min/1.73m2    Calcium 8.7 8.5 - 10.1 MG/DL    Bilirubin, total 2.3 (H) 0.2 - 1.0 MG/DL    ALT (SGPT) 473 (H) 12 - 78 U/L    AST (SGOT) 190 (H) 15 - 37 U/L    Alk.  phosphatase 141 (H) 45 - 117 U/L    Protein, total 7.2 6.4 - 8.2 g/dL    Albumin 3.2 (L) 3.5 - 5.0 g/dL    Globulin 4.0 2.0 - 4.0 g/dL    A-G Ratio 0.8 (L) 1.1 - 2.2     LIPASE    Collection Time: 01/15/19 11:54 PM   Result Value Ref Range    Lipase 160 73 - 393 U/L   NT-PRO BNP    Collection Time: 01/15/19 11:54 PM   Result Value Ref Range    NT pro-BNP 19 0 - 125 PG/ML   URINALYSIS W/ RFLX MICROSCOPIC    Collection Time: 01/16/19 12:02 AM   Result Value Ref Range    Color DARK YELLOW      Appearance CLEAR CLEAR      Specific gravity 1.023 1.003 - 1.030      pH (UA) 5.5 5.0 - 8.0      Protein NEGATIVE  NEG mg/dL    Glucose NEGATIVE  NEG mg/dL    Ketone NEGATIVE  NEG mg/dL    Blood NEGATIVE  NEG      Urobilinogen 1.0 0.2 - 1.0 EU/dL    Nitrites NEGATIVE  NEG      Leukocyte Esterase NEGATIVE  NEG     BILIRUBIN, CONFIRM    Collection Time: 01/16/19 12:02 AM   Result Value Ref Range    Bilirubin UA, confirm POSITIVE (A) NEG     PTT    Collection Time: 01/16/19  3:51 AM   Result Value Ref Range    aPTT 30.2 22.1 - 32.0 sec    aPTT, therapeutic range     58.0 - 77.0 SECS   PROTHROMBIN TIME + INR    Collection Time: 01/16/19  3:51 AM   Result Value Ref Range    INR 1.0 0.9 - 1.1      Prothrombin time 10.7 9.0 - 11.1 sec   LACTIC ACID    Collection Time: 01/16/19  4:00 AM   Result Value Ref Range    Lactic acid 0.5 0.4 - 2.0 MMOL/L       Radiologic Studies -   CT ABD PELV W CONT   Final Result   IMPRESSION:      1. Thrombosis of the left portal vein. Correlate clinically for signs of   cirrhosis, hypercoagulable state, or malignancy. 2. Enlarged prostate. US SCROTUM/TESTICLES   Final Result   IMPRESSION:    1. Bilateral testicular masses. Follow-up with urology is recommended. 2. Small left hydrocele. 3. Right epididymal head cyst.          XR CHEST PA LAT   Final Result   IMPRESSION:   NORMAL CHEST. US ABD LTD    (Results Pending)   XR CHEST SNGL V    (Results Pending)     RUQ US:  Ultrasonography of the right upper quadrant was performed. The gallbladder has  been surgically removed. The pancreas appears normal. The liver is normal in  echotexture. There is no intrahepatic duct dilatation or mass. The common bile  duct measures 4 mm.  Portal vein flow is hepatopedal. The right kidney measures  10.4 cm and is normal in echotexture without hydronephrosis or mass. There is a  1.7 cm right renal cyst.     IMPRESSION  IMPRESSION: Unremarkable right upper quadrant ultrasound status post  cholecystectomy. CT Results  (Last 48 hours)               01/16/19 0428  CT ABD PELV W CONT Final result    Impression:  IMPRESSION:       1. Thrombosis of the left portal vein. Correlate clinically for signs of   cirrhosis, hypercoagulable state, or malignancy. 2. Enlarged prostate. Narrative:  EXAM: CT ABD PELV W CONT       INDICATION: testicular swelling       COMPARISON: June 11, 2018        CONTRAST: 100 mL of Isovue-370. TECHNIQUE:    Following the uneventful intravenous administration of contrast, thin axial   images were obtained through the abdomen and pelvis. Coronal and sagittal   reconstructions were generated. Oral contrast was not administered. CT dose   reduction was achieved through use of a standardized protocol tailored for this   examination and automatic exposure control for dose modulation. FINDINGS:    LUNG BASES: Mild subsegmental atelectasis. INCIDENTALLY IMAGED HEART AND MEDIASTINUM: Unremarkable. LIVER: Left portal venous thrombosis. GALLBLADDER: Unremarkable. SPLEEN: No mass. PANCREAS: No mass or ductal dilatation. ADRENALS: Unremarkable. KIDNEYS: Right renal cyst.   STOMACH: Unremarkable. SMALL BOWEL: No dilatation or wall thickening. COLON: No dilatation or wall thickening. APPENDIX: Unremarkable. PERITONEUM: No ascites or pneumoperitoneum. RETROPERITONEUM: No lymphadenopathy or aortic aneurysm. REPRODUCTIVE ORGANS: Enlarged prostate. URINARY BLADDER: No mass or calculus. BONES: No destructive bone lesion. ADDITIONAL COMMENTS: N/A               CXR Results  (Last 48 hours)               01/16/19 0038  XR CHEST PA LAT Final result    Impression:  IMPRESSION:   NORMAL CHEST.                        Narrative:  History: Swelling in various parts of the body of the last few months. Frontal and lateral views of the chest show clear lungs. The heart, mediastinum   and pulmonary vasculature are normal.  The bony thorax is unremarkable. Medical Decision Making   I am the first provider for this patient. I reviewed the vital signs, available nursing notes, past medical history, past surgical history, family history and social history. Vital Signs-Reviewed the patient's vital signs. Patient Vitals for the past 12 hrs:   Temp Pulse Resp BP SpO2   01/16/19 0442 99.3 °F (37.4 °C) 72 18 137/72 97 %   01/15/19 2252 99 °F (37.2 °C) 65 16 149/82 100 %       Pulse Oximetry Analysis - 100% on RA    Records Reviewed: Nursing Notes and Old Medical Records    Provider Notes (Medical Decision Making):   DDx: HAZEL, low albumin, varicocele, hydrocele, UTI    ED Course:   Initial assessment performed. The patients presenting problems have been discussed, and they are in agreement with the care plan formulated and outlined with them. I have encouraged them to ask questions as they arise throughout their visit. 12:31 AM  Patient's presentation, labs/imaging and plan of care was reviewed with Cornelia Chapman DO as part of sign out. Corenlia Chapman DO's assistance in completion of this plan is greatly appreciated but it should be noted that I will be the provider of record for this patient. 12:52 AM  Spoke with XR. They state they will page US.     3:35 AM  Pt reevaluated. Pt updated on US results. Oral temperature is 100.6. Will get lactate, hepatitis panel, blood cultures, and CT abd/pelvis. Plan for admission. CONSULT NOTE:   5:10 AM  Cornelia Chapman DO spoke with Remedios Sage MD   Specialty: Hospitalist  Discussed pt's hx, disposition, and available diagnostic and imaging results. Reviewed care plans. Consultant will evaluate pt for admission.   Written by Elizabeth Zapien [facility] [employer], as dictated by Cornelia Chapman DO.    CRITICAL CARE NOTE :    5:10 AM    IMPENDING DETERIORATION -Metabolic and Hepatic  ASSOCIATED RISK FACTORS - Metabolic changes and thrombosis, new onset  malignancy, low grade fever, cellulitis  MANAGEMENT- Bedside Assessment and Supervision of Care  INTERPRETATION -  Xrays, CT Scan, Blood Pressure and Screening Ultrasound  INTERVENTIONS - Metobolic interventions and anticoagulation  CASE REVIEW - Hospitalist, Nursing and Family  TREATMENT RESPONSE -Stable  PERFORMED BY - Self and Physician    NOTES   :    I have spent 45 minutes of critical care time involved in lab review, consultations with specialist, family decision- making, bedside attention and documentation. During this entire length of time I was immediately available to the patient . Laverne Sepulveda DO    Disposition:  ADMIT NOTE:  5:10 AM  The patient is being admitted to the hospital by Rosana Ramos MD.  The results of their tests and reasons for their admission have been discussed with the patient and/or available family. They convey agreement and understanding for the need to be admitted and for their admission diagnosis. PLAN:  1. Admit to hospitalist      Diagnosis     Clinical Impression:   1. Rash    2. Elevated LFTs    3. Portal vein thrombosis    4. Cellulitis of other specified site    5. Testicular mass        Attestations: This note is prepared by Hyuen Phillips, acting as Scribe for [employer]. Allison Russ MD.    [employer]. Allison Russ MD: The scribe's documentation has been prepared under my direction and personally reviewed by me in its entirety. I confirm that the note above accurately reflects all work, treatment, procedures, and medical decision making performed by me.

## 2019-01-16 NOTE — CONSULTS
Urology Consult    Subjective:     Date of Consultation:  2019    Referring Physician: ER    Reason for Consultation:  Testicular swelling and masses    Patient Name: Terell Bloom  MRN: [id number]    History of Present Illness:    Terell Bloom, 70 y.o. male with PMHx significant for hypercholesterolemia, kidney stones, arm and leg swelling, and enlarged prostate, presents ambulatory to the ED with cc of new onset swelling of his right hand and jaw since Saturday (19) and scrotum since this evening. Pt also notes chronic arm and leg swelling. Pt notes that he has a PMHx of arm and leg swelling in the past, but notes that he never had hand, jaw, and scrotum swelling. In addition, he notes lower abd pain and lower back pain when he tries to breath in or cough. On flomax and finasteride for BPH. He notes having kidney stone in the past , possibly underwent ESWL. Denies obstructive or irritative voiding symptoms. No fever or chills. No trauma to the scrotum. Denies any scrotal pain. Denies hematuria, dysuria or history of UTI's States he gets annual prostate exams and PSA checks by his PCP. No family history of prostate cancer. Past Medical History:   Diagnosis Date    Enlarged prostate     Hypercholesterolemia       Past Surgical History:   Procedure Laterality Date    CARDIAC SURG PROCEDURE UNLIST  2006    stent placement      No family history on file. Social History     Tobacco Use    Smoking status: Former Smoker     Types: Cigarettes     Last attempt to quit: 1988     Years since quittin.5    Smokeless tobacco: Never Used   Substance Use Topics    Alcohol use: No     No Known Allergies   Prior to Admission medications    Medication Sig Start Date End Date Taking? Authorizing Provider   HYDROcodone-acetaminophen (NORCO) 5-325 mg per tablet Take 1 Tab by mouth every four (4) hours as needed. Max Daily Amount: 6 Tabs.  18   Rena Mg MD   finasteride (PROSCAR) 5 mg tablet Take 5 mg by mouth daily. Michelle Cummings MD   Fenofibrate 120 mg tab Take  by mouth. Michelle Cummings MD   tamsulosin (FLOMAX) 0.4 mg capsule Take 0.4 mg by mouth daily. Michelle Cummings MD   aspirin 81 mg chewable tablet Take 81 mg by mouth daily. Michelle Cummings MD   SIMVASTATIN PO Take  by mouth. Michelle Cummings MD         Review of Systems:  A comprehensive review of systems was negative except for that written in the HPI. Objective:     Data Review (Labs):    Recent Labs     19  1055 19  0351 01/15/19  2354   WBC 5.5  --  5.7   HGB 12.0*  --  12.9   MCV 84.5  --  85.6     --  267   NA  --   --  139   K  --   --  4.1   CREA  --   --  1.16   BUN  --   --  15   ALB  --   --  3.2*   TBILI  --   --  2.3*   SGOT  --   --  190*   ALT  --   --  473*   AP  --   --  141*   LPSE  --   --  160   INR  --  1.0  --    IPVITALS(8:)Temp (24hrs), Av °F (37.2 °C), Min:98.5 °F (36.9 °C), Max:99.3 °F (37.4 °C)    Temp (24hrs), Av °F (37.2 °C), Min:98.5 °F (36.9 °C), Max:99.3 °F (37.4 °C)  [id number] intake/output data recorded. Physical Exam:            General:    alert, cooperative, no distress, appears stated age                     Skin:  Normal.   Lymph nodes:  Cervical, supraclavicular, and axillary nodes normal.             Abdomen[de-identified]  soft, non-tender. Bowel sounds normal. No masses,  no organomegaly             Genitalia:  penis exam: non focal circumcised, mild scrotal edema, no tenderness or signs of infection, testicles with no masses or tenderness bilaterally          Extremities:  negative     US personally visualized- bilateral testicular masses noted, hypoechoic. INDICATION: Scrotal swelling.     COMPARISON: None.     TECHNIQUE:  Real-time sonography of the scrotum was performed with a high frequency linear  transducer. Multiple static images were obtained.  Color Doppler evaluation was  also performed.     FINDINGS:  RIGHT TESTICLE: The right testicle is normal in size and echotexture with normal  color-flow. The right testis measures 5.3 x 3.3 x 2.2 cm and the right  testicular volume is 20.1 cc. There are 2 hypodense masses within the right  testicle the mass in the upper portion of the testicle measures 6.3 x 5.1 x 4.1  mm. The mass of the lower testicle measures 60.3 x 4.9 x 4.8 mm.     RIGHT EPIDIDYMIS: 4.9 mm epididymal head cyst.     LEFT TESTICLE: The left testicle is normal in size and echotexture with normal  color-flow. The left testis measures 5.0 x 2.5 x 2.5 cm and the left testicular  volume is 16.6 cc. There is a hypoechoic 6.5 x 6.3 x 5.8 mm nodule in the  midpole. There is a small hydrocele.     LEFT EPIDIDYMIS: The left epididymis is normal.     IMPRESSION  IMPRESSION:   1. Bilateral testicular masses. Follow-up with urology is recommended. 2. Small left hydrocele. 3. Right epididymal head cyst.      CT ABD PELV W CONT     INDICATION: Abdominal pain, RLQ     COMPARISON: 2013     CONTRAST:  100 mL of Isovue-370.     TECHNIQUE:   Following the uneventful intravenous administration of contrast, thin axial  images were obtained through the abdomen and pelvis. Coronal and sagittal  reconstructions were generated. Oral contrast was not administered. CT dose  reduction was achieved through use of a standardized protocol tailored for this  examination and automatic exposure control for dose modulation.     FINDINGS:   LUNG BASES: Mild hypoventilatory change at the lung bases. INCIDENTALLY IMAGED HEART AND MEDIASTINUM: Unremarkable. LIVER: No mass or biliary dilatation. GALLBLADDER: Cholelithiasis. SPLEEN: No mass. PANCREAS: No mass or ductal dilatation. ADRENALS: Unremarkable. KIDNEYS: Right renal cyst.  STOMACH: Unremarkable. SMALL BOWEL: No dilatation or wall thickening. COLON: No dilatation or wall thickening. APPENDIX: Unremarkable. PERITONEUM: No ascites or pneumoperitoneum.   RETROPERITONEUM: No lymphadenopathy or aortic aneurysm. REPRODUCTIVE ORGANS: Enlarged prostate. URINARY BLADDER: No mass or calculus. BONES: No destructive bone lesion. ADDITIONAL COMMENTS: N/A     IMPRESSION  IMPRESSION:     1. No acute findings. 2. Cholelithiasis. 3. Enlarged prostate.       Assessment:     Active Problems:    Portal vein thrombosis (1/16/2019)      Testicular mass (1/16/2019)          Scrotal edema    Plan: Will check tumor markers but have low index of suspicion for testicular CA. Scrotal edema appears to be related to systemic issue and has improved. Will schedule follow up in the office.  No further urologic intervention needed in inpatient setting    Signed By: Dev Braga MD                         January 16, 2019

## 2019-01-16 NOTE — PROGRESS NOTES
Pt had order for stat heparin gtt at 08:45 in ED. Pt arrived to floor without heparin gtt running. This RN looked at order which was now for heparin gtt to start at 17:00. Call to Jarrod Landa, pharmacist to review order. Jarrod Landa reviewed pts chart, pt received SC Lovenox in ED therefore the heparin gtt should start 12 hrs after the Lovenox which is tonight at 17:00.

## 2019-01-16 NOTE — CONSULTS
Arben Mata  MR#: [id number]  : 1950  ACCOUNT #: [de-identified]   DATE OF SERVICE: 2019    REASON FOR CONSULTATION:  Testicular masses. REFERRING PHYSICIAN:  [facility]    HISTORY OF PRESENT ILLNESS:  The patient is a 78-year-old gentleman, who came to the emergency room because of scrotal swelling. He was found in the emergency room on CT scan of his abdomen and pelvis to have thrombosis of the left portal vein and enlarged prostate. He had an ultrasound done of his scrotum/testicles, that showed bilateral testicular masses. The patient was admitted, started on a heparin drip. We are asked to see him for further evaluation. This morning he reports that he is feeling better and he thinks the swelling has gone down some in his scrotum. PAST MEDICAL HISTORY:  Enlarged prostate, high cholesterol. MEDICATIONS:  Prior to admission Proscar, Flomax, hydrocodone/Tylenol. ALLERGIES:  NO KNOWN DRUG ALLERGIES. SOCIAL HISTORY:  He is a former smoker. He quit 30 years ago. He does not drink. FAMILY HISTORY:  No cancers in the family. REVIEW OF SYSTEMS:  A 12-point systems done and negative except for as above. PHYSICAL EXAMINATION:  VITAL SIGNS:  Temperature 98.5, pulse 61, blood pressure 116/64, respirations 16, satting 98% on room air. GENERAL:  Lying in bed, in no acute distress. HEENT:  Normocephalic, atraumatic. Extraocular muscles are intact. Oropharynx is clear, without lesions. NECK:  Supple. No cervical, supraclavicular, or axillary lymphadenopathy appreciated. CARDIOVASCULAR:  Regular rate and rhythm. LUNGS:  Good aeration bilaterally. ABDOMEN:  Normoactive bowel sounds, soft, nontender, nondistended. No inguinal lymphadenopathy. GENITOURINARY:  He has swelling in his testicle. It was difficult to discern necessarily a mass.     IMPRESSION AND PLAN:  The patient is a 78-year-old gentleman, who we are asked to see for bilateral testicular masses. Possibility is that this could be a nonseminomatous/testicular cancer, could also be a lymphoma. We are sending off markers, beta hCG, AFP, LDH. Urology has been consulted. We will need to get a biopsy of this. For the portal vein thrombosis he is on heparin drip. Hepatology has been consulted for that. We will continue to follow along with you. I talked to the patient that we need to get a biopsy to see what the tumor markers show and make further recommendations as those return.       MD KRISTIN Leger / ALFREDO  D: 01/16/2019 11:55     T: 01/16/2019 12:38  JOB #: [id number]

## 2019-01-16 NOTE — ED NOTES
TRANSFER - OUT REPORT:    Verbal report given to timo gutierrez(name) on [facility] Antoine  being transferred to gen surg tele (unit) for routine progression of care       Report consisted of patients Situation, Background, Assessment and   Recommendations(SBAR). Information from the following report(s) SBAR was reviewed with the receiving nurse. Lines:   Peripheral IV 01/15/19 Right Antecubital (Active)        Opportunity for questions and clarification was provided.       Patient transported with:   [employer]

## 2019-01-16 NOTE — ED NOTES
Assumed care of patient. Call bell within reach.   Hospitalist in to see, pt ambulated on own to bathroom

## 2019-01-16 NOTE — PROGRESS NOTES
Gi consult  I am aware of patient and will see later today    Celia De La Fuente MD  10:28 AM  1/16/2019

## 2019-01-16 NOTE — H&P
GENERAL GENERIC H&P/CONSULT    Subjective:  Annelise Pablo, 70 y.o. male with PMHx significant for hypercholesterolemia, kidney stones, arm and leg swelling, and enlarged prostate, presents ambulatory to the ED with cc of new onset swelling of his right hand and jaw since Saturday (1/12/19) and scrotum since this evening. Pt also notes chronic arm and leg swelling. Pt notes that he has a PMHx of arm and leg swelling in the past, but notes that he never had hand, jaw, and scrotum swelling. In addition, he notes lower abd pain and lower back pain when he tries to breath in or cough. He notes he take medication for benign prostate enlargement. He notes having kidney stone in the past but denies any chronic kidney problems or chronic liver problems. He denies any blood pressure medicine. He denies consumption of overly salty foods. He specifically denies fever, SOB, and weight change.   There are no other complaints, changes, or physical findings at this time      Past Medical History:   Diagnosis Date    Enlarged prostate     Hypercholesterolemia       Past Surgical History:   Procedure Laterality Date    CARDIAC SURG PROCEDURE UNLIST  2006    stent placement      Prior to Admission medications    Medication Sig Start Date End Date Taking? Authorizing Provider   HYDROcodone-acetaminophen (NORCO) 5-325 mg per tablet Take 1 Tab by mouth every four (4) hours as needed. Max Daily Amount: 6 Tabs. 6/12/18   Lis Patel MD   finasteride (PROSCAR) 5 mg tablet Take 5 mg by mouth daily. Michelle Cummings MD   Fenofibrate 120 mg tab Take  by mouth. Michelle Cummings MD   tamsulosin (FLOMAX) 0.4 mg capsule Take 0.4 mg by mouth daily. Michelle Cummings MD   aspirin 81 mg chewable tablet Take 81 mg by mouth daily. Michelle Cummings MD   SIMVASTATIN PO Take  by mouth.     Michelle Cummings MD     No Known Allergies   Social History     Tobacco Use    Smoking status: Former Smoker     Types: Cigarettes     Last attempt to quit: 6/29/1988 Years since quittin.5    Smokeless tobacco: Never Used   Substance Use Topics    Alcohol use: No      No family history on file. Review of Systems   Constitutional: Positive for fatigue. Genitourinary: Positive for testicular pain. Objective:    No intake/output data recorded. No intake/output data recorded. Patient Vitals for the past 8 hrs:   BP Temp Pulse Resp SpO2   19 0817 125/66 99.3 °F (37.4 °C) 66 16 99 %   19 0640 122/74 -- 69 18 98 %   19 0445 137/72 -- -- -- 98 %   19 0442 137/72 99.3 °F (37.4 °C) 72 18 97 %   19 0400 127/67 -- -- -- 98 %     Physical Exam   Constitutional: He appears well-developed and well-nourished. HENT:   Head: Normocephalic and atraumatic. Right Ear: External ear normal.   Left Ear: External ear normal.   Nose: Nose normal.   Mouth/Throat: Oropharynx is clear and moist.   Eyes: EOM are normal. Pupils are equal, round, and reactive to light. Neck: Normal range of motion. Neck supple. Cardiovascular: Normal rate, regular rhythm, normal heart sounds and intact distal pulses. Pulmonary/Chest: Effort normal and breath sounds normal.   Abdominal: Soft. Bowel sounds are normal.   Genitourinary:   Genitourinary Comments: Bilateral testicular enlargement , left hydrocele   Musculoskeletal: Normal range of motion. Neurological: He is alert.         Labs:    Recent Results (from the past 24 hour(s))   CBC WITH AUTOMATED DIFF    Collection Time: 01/15/19 11:54 PM   Result Value Ref Range    WBC 5.7 4.1 - 11.1 K/uL    RBC 4.52 4.10 - 5.70 M/uL    HGB 12.9 12.1 - 17.0 g/dL    HCT 38.7 36.6 - 50.3 %    MCV 85.6 80.0 - 99.0 FL    MCH 28.5 26.0 - 34.0 PG    MCHC 33.3 30.0 - 36.5 g/dL    RDW 15.1 (H) 11.5 - 14.5 %    PLATELET 642 307 - 441 K/uL    MPV 10.0 8.9 - 12.9 FL    NRBC 0.0 0  WBC    ABSOLUTE NRBC 0.00 0.00 - 0.01 K/uL    NEUTROPHILS 60 32 - 75 %    LYMPHOCYTES 26 12 - 49 %    MONOCYTES 11 5 - 13 %    EOSINOPHILS 3 0 - 7 % BASOPHILS 0 0 - 1 %    IMMATURE GRANULOCYTES 0 0.0 - 0.5 %    ABS. NEUTROPHILS 3.4 1.8 - 8.0 K/UL    ABS. LYMPHOCYTES 1.5 0.8 - 3.5 K/UL    ABS. MONOCYTES 0.6 0.0 - 1.0 K/UL    ABS. EOSINOPHILS 0.2 0.0 - 0.4 K/UL    ABS. BASOPHILS 0.0 0.0 - 0.1 K/UL    ABS. IMM. GRANS. 0.0 0.00 - 0.04 K/UL    DF AUTOMATED     METABOLIC PANEL, COMPREHENSIVE    Collection Time: 01/15/19 11:54 PM   Result Value Ref Range    Sodium 139 136 - 145 mmol/L    Potassium 4.1 3.5 - 5.1 mmol/L    Chloride 105 97 - 108 mmol/L    CO2 27 21 - 32 mmol/L    Anion gap 7 5 - 15 mmol/L    Glucose 88 65 - 100 mg/dL    BUN 15 6 - 20 MG/DL    Creatinine 1.16 0.70 - 1.30 MG/DL    BUN/Creatinine ratio 13 12 - 20      GFR est AA >60 >60 ml/min/1.73m2    GFR est non-AA >60 >60 ml/min/1.73m2    Calcium 8.7 8.5 - 10.1 MG/DL    Bilirubin, total 2.3 (H) 0.2 - 1.0 MG/DL    ALT (SGPT) 473 (H) 12 - 78 U/L    AST (SGOT) 190 (H) 15 - 37 U/L    Alk.  phosphatase 141 (H) 45 - 117 U/L    Protein, total 7.2 6.4 - 8.2 g/dL    Albumin 3.2 (L) 3.5 - 5.0 g/dL    Globulin 4.0 2.0 - 4.0 g/dL    A-G Ratio 0.8 (L) 1.1 - 2.2     LIPASE    Collection Time: 01/15/19 11:54 PM   Result Value Ref Range    Lipase 160 73 - 393 U/L   NT-PRO BNP    Collection Time: 01/15/19 11:54 PM   Result Value Ref Range    NT pro-BNP 19 0 - 125 PG/ML   URINALYSIS W/ RFLX MICROSCOPIC    Collection Time: 01/16/19 12:02 AM   Result Value Ref Range    Color DARK YELLOW      Appearance CLEAR CLEAR      Specific gravity 1.023 1.003 - 1.030      pH (UA) 5.5 5.0 - 8.0      Protein NEGATIVE  NEG mg/dL    Glucose NEGATIVE  NEG mg/dL    Ketone NEGATIVE  NEG mg/dL    Blood NEGATIVE  NEG      Urobilinogen 1.0 0.2 - 1.0 EU/dL    Nitrites NEGATIVE  NEG      Leukocyte Esterase NEGATIVE  NEG     BILIRUBIN, CONFIRM    Collection Time: 01/16/19 12:02 AM   Result Value Ref Range    Bilirubin UA, confirm POSITIVE (A) NEG     CULTURE, BLOOD, PAIRED    Collection Time: 01/16/19  3:33 AM   Result Value Ref Range    Special Requests: NO SPECIAL REQUESTS      Culture result: NO GROWTH AFTER 2 HOURS     MONONUCLEOSIS SCREEN    Collection Time: 01/16/19  3:51 AM   Result Value Ref Range    Mononucleosis screen NEGATIVE  NEG     PTT    Collection Time: 01/16/19  3:51 AM   Result Value Ref Range    aPTT 30.2 22.1 - 32.0 sec    aPTT, therapeutic range     58.0 - 77.0 SECS   PROTHROMBIN TIME + INR    Collection Time: 01/16/19  3:51 AM   Result Value Ref Range    INR 1.0 0.9 - 1.1      Prothrombin time 10.7 9.0 - 11.1 sec   HEPATITIS PANEL, ACUTE    Collection Time: 01/16/19  3:51 AM   Result Value Ref Range    Hepatitis A, IgM NONREACTIVE NR      __          Hepatitis B surface Ag <0.10 Index    Hep B surface Ag Interp. NEGATIVE  NEG      __          Hepatitis B core, IgM NONREACTIVE NR      __          Hep C  virus Ab Interp. NONREACTIVE NR      Hep C  virus Ab comment Method used is [employer]     LACTIC ACID    Collection Time: 01/16/19  4:00 AM   Result Value Ref Range    Lactic acid 0.5 0.4 - 2.0 MMOL/L     .    XR Results (maximum last 3): Results from [place] encounter on 01/15/19   XR CHEST SNGL V    Impression IMPRESSION: Minimal left basilar atelectasis. XR CHEST PA LAT    Impression IMPRESSION:  NORMAL CHEST. Results from [place] encounter on 07/08/13   XR ABD ACUTE W 1 V CHEST       CT Results (maximum last 3): Results from [place] encounter on 01/15/19   CT ABD PELV W CONT    Impression IMPRESSION:    1. Thrombosis of the left portal vein. Correlate clinically for signs of  cirrhosis, hypercoagulable state, or malignancy. 2. Enlarged prostate. Results from [place] encounter on 06/11/18   CT ABD PELV W CONT    Impression IMPRESSION:    1. No acute findings. 2. Cholelithiasis. 3. Enlarged prostate. Results from [place] encounter on 07/08/13   CT ABD PELV WO CONT         US Results (maximum last 3):   Results from [place] encounter on 01/15/19   US ABD LTD    Impression IMPRESSION: Unremarkable right upper quadrant ultrasound status post  cholecystectomy. US SCROTUM/TESTICLES    Impression IMPRESSION:   1. Bilateral testicular masses. Follow-up with urology is recommended. 2. Small left hydrocele. 3. Right epididymal head cyst.      Results from Hospital Encounter encounter on 06/11/18   US ABD LTD    Impression IMPRESSION: Cholelithiasis. Borderline thick gallbladder wall. No biliary  dilation. Assessment:  Active Problems:    Portal vein thrombosis (1/16/2019)      Testicular mass (1/16/2019)        Plan:    1. Bilateral Testicular Mass with left Hydrocele ; consult Urology, Hem/ Onc  2. Portal vein thrombosis ; on IV heparin,   3. Abnormal LFT ; Hepatitis panel  4. H/o Nephrolithiasis ; as per Urology  5. Chololithiasis ; stable  6. Hyperlipidemia ; check lipid Panel  7. DVT Prophylaxis ;  On iV Heparin  Signed:  Akil Martines MD 1/16/2019

## 2019-01-16 NOTE — ED NOTES
>pt a.o x4. Steady amb gait from triage. Reports noticed bilat testicle swelling x ~ 1.5 hours, no hx of same in the past. Does report that various parts of his body have been intermittently swelling over the past couple of months, and has been to the dr secondary to same. Denies any difficulties w/ urination, denies any dysuria, oliguria, or hematuria. Pt denies any pain as well. Wife at bedside. Pt changed into gown. Waiting to be seen by provider at this time.

## 2019-01-16 NOTE — PROGRESS NOTES
Pt requires a diet order and code status. Page to Dr Lakia Rice. Return call, order cardiac diet, MD will put in code status.

## 2019-01-16 NOTE — PROGRESS NOTES
Asked ED to complete STAT orders before sending up. Was told \"if I have time I will. \" Patient came to room with no code status on file. Had to notify MD for code status. Patient Heparin order is to be held until 1700 today due to alread receiving lovenox shot in ED      General Surgery End of Shift Nursing Note    Bedside shift change report given to Reynaldo West (oncoming nurse) by Nydia Borrego (offgoing nurse). Report included the following information SBAR and MAR. Shift worked:   0961-4h   Summary of shift:    See above   Issues for physician to address:   none     Number times ambulated in hallway past shift: 0    Number of times OOB to chair past shift: 0    Pain Management:  Current medication: see mar  Patient states pain is manageable on current pain medication: YES    GI:    Current diet:  DIET CARDIAC Regular    Tolerating current diet: YES  Passing flatus: YES  Last Bowel Movement: yesterday   Appearance: fromed    Respiratory:    Incentive Spirometer at bedside: YES  Patient instructed on use: YES    Patient Safety:    Falls Score: 1  Bed Alarm On? No  Sitter?  No    Bushra Orn

## 2019-01-17 LAB
ALBUMIN SERPL-MCNC: 2.9 G/DL (ref 3.5–5)
ALBUMIN/GLOB SERPL: 0.7 {RATIO} (ref 1.1–2.2)
ALP SERPL-CCNC: 128 U/L (ref 45–117)
ALT SERPL-CCNC: 356 U/L (ref 12–78)
ANION GAP SERPL CALC-SCNC: 7 MMOL/L (ref 5–15)
APTT PPP: 73.9 SEC (ref 22.1–32)
APTT PPP: >130 SEC (ref 22.1–32)
AST SERPL-CCNC: 150 U/L (ref 15–37)
BILIRUB DIRECT SERPL-MCNC: 1.1 MG/DL (ref 0–0.2)
BILIRUB SERPL-MCNC: 1.8 MG/DL (ref 0.2–1)
BUN SERPL-MCNC: 10 MG/DL (ref 6–20)
BUN/CREAT SERPL: 9 (ref 12–20)
CALCIUM SERPL-MCNC: 8.7 MG/DL (ref 8.5–10.1)
CHLORIDE SERPL-SCNC: 105 MMOL/L (ref 97–108)
CO2 SERPL-SCNC: 25 MMOL/L (ref 21–32)
COMMENT, HOLDF: NORMAL
CREAT SERPL-MCNC: 1.06 MG/DL (ref 0.7–1.3)
ERYTHROCYTE [DISTWIDTH] IN BLOOD BY AUTOMATED COUNT: 15.1 % (ref 11.5–14.5)
GLOBULIN SER CALC-MCNC: 4 G/DL (ref 2–4)
GLUCOSE SERPL-MCNC: 89 MG/DL (ref 65–100)
HCT VFR BLD AUTO: 35.6 % (ref 36.6–50.3)
HGB BLD-MCNC: 11.8 G/DL (ref 12.1–17)
INR PPP: 1.1 (ref 0.9–1.1)
LDH SERPL L TO P-CCNC: 194 U/L (ref 85–241)
MCH RBC QN AUTO: 27.8 PG (ref 26–34)
MCHC RBC AUTO-ENTMCNC: 33.1 G/DL (ref 30–36.5)
MCV RBC AUTO: 83.8 FL (ref 80–99)
NRBC # BLD: 0 K/UL (ref 0–0.01)
NRBC BLD-RTO: 0 PER 100 WBC
PLATELET # BLD AUTO: 252 K/UL (ref 150–400)
PMV BLD AUTO: 9.7 FL (ref 8.9–12.9)
POTASSIUM SERPL-SCNC: 3.8 MMOL/L (ref 3.5–5.1)
PROT SERPL-MCNC: 6.9 G/DL (ref 6.4–8.2)
PROTHROMBIN TIME: 11.7 SEC (ref 9–11.1)
RBC # BLD AUTO: 4.25 M/UL (ref 4.1–5.7)
SAMPLES BEING HELD,HOLD: NORMAL
SODIUM SERPL-SCNC: 137 MMOL/L (ref 136–145)
THERAPEUTIC RANGE,PTTT: ABNORMAL SECS (ref 58–77)
THERAPEUTIC RANGE,PTTT: ABNORMAL SECS (ref 58–77)
WBC # BLD AUTO: 6.2 K/UL (ref 4.1–11.1)

## 2019-01-17 PROCEDURE — 83615 LACTATE (LD) (LDH) ENZYME: CPT

## 2019-01-17 PROCEDURE — 82105 ALPHA-FETOPROTEIN SERUM: CPT

## 2019-01-17 PROCEDURE — 83516 IMMUNOASSAY NONANTIBODY: CPT

## 2019-01-17 PROCEDURE — 65270000029 HC RM PRIVATE

## 2019-01-17 PROCEDURE — 85027 COMPLETE CBC AUTOMATED: CPT

## 2019-01-17 PROCEDURE — 74011250636 HC RX REV CODE- 250/636: Performed by: INTERNAL MEDICINE

## 2019-01-17 PROCEDURE — 85610 PROTHROMBIN TIME: CPT

## 2019-01-17 PROCEDURE — 85730 THROMBOPLASTIN TIME PARTIAL: CPT

## 2019-01-17 PROCEDURE — 80048 BASIC METABOLIC PNL TOTAL CA: CPT

## 2019-01-17 PROCEDURE — 74011000258 HC RX REV CODE- 258: Performed by: INTERNAL MEDICINE

## 2019-01-17 PROCEDURE — 80076 HEPATIC FUNCTION PANEL: CPT

## 2019-01-17 PROCEDURE — 84702 CHORIONIC GONADOTROPIN TEST: CPT

## 2019-01-17 PROCEDURE — 36415 COLL VENOUS BLD VENIPUNCTURE: CPT

## 2019-01-17 PROCEDURE — 86038 ANTINUCLEAR ANTIBODIES: CPT

## 2019-01-17 PROCEDURE — 74011250637 HC RX REV CODE- 250/637: Performed by: INTERNAL MEDICINE

## 2019-01-17 RX ORDER — HEPARIN SODIUM 5000 [USP'U]/ML
5000 INJECTION, SOLUTION INTRAVENOUS; SUBCUTANEOUS EVERY 8 HOURS
Status: DISCONTINUED | OUTPATIENT
Start: 2019-01-17 | End: 2019-01-17

## 2019-01-17 RX ORDER — FENOFIBRATE 145 MG/1
145 TABLET, COATED ORAL DAILY
Status: DISCONTINUED | OUTPATIENT
Start: 2019-01-18 | End: 2019-01-18

## 2019-01-17 RX ORDER — PRAVASTATIN SODIUM 40 MG/1
80 TABLET ORAL
Status: DISCONTINUED | OUTPATIENT
Start: 2019-01-17 | End: 2019-01-18

## 2019-01-17 RX ORDER — TAMSULOSIN HYDROCHLORIDE 0.4 MG/1
0.4 CAPSULE ORAL DAILY
Status: DISCONTINUED | OUTPATIENT
Start: 2019-01-18 | End: 2019-01-18 | Stop reason: HOSPADM

## 2019-01-17 RX ORDER — ENOXAPARIN SODIUM 100 MG/ML
1 INJECTION SUBCUTANEOUS EVERY 12 HOURS
Status: DISCONTINUED | OUTPATIENT
Start: 2019-01-17 | End: 2019-01-18 | Stop reason: HOSPADM

## 2019-01-17 RX ORDER — GUAIFENESIN 100 MG/5ML
81 LIQUID (ML) ORAL DAILY
Status: DISCONTINUED | OUTPATIENT
Start: 2019-01-18 | End: 2019-01-18 | Stop reason: HOSPADM

## 2019-01-17 RX ORDER — SODIUM CHLORIDE 0.9 % (FLUSH) 0.9 %
5-40 SYRINGE (ML) INJECTION EVERY 8 HOURS
Status: DISCONTINUED | OUTPATIENT
Start: 2019-01-17 | End: 2019-01-18 | Stop reason: HOSPADM

## 2019-01-17 RX ORDER — SODIUM CHLORIDE 0.9 % (FLUSH) 0.9 %
5-40 SYRINGE (ML) INJECTION AS NEEDED
Status: DISCONTINUED | OUTPATIENT
Start: 2019-01-17 | End: 2019-01-18 | Stop reason: HOSPADM

## 2019-01-17 RX ORDER — FINASTERIDE 5 MG/1
5 TABLET, FILM COATED ORAL DAILY
Status: DISCONTINUED | OUTPATIENT
Start: 2019-01-18 | End: 2019-01-18 | Stop reason: HOSPADM

## 2019-01-17 RX ADMIN — PRAVASTATIN SODIUM 80 MG: 40 TABLET ORAL at 20:29

## 2019-01-17 RX ADMIN — Medication 10 ML: at 20:31

## 2019-01-17 RX ADMIN — HEPARIN SODIUM AND DEXTROSE 19 UNITS/KG/HR: 10000; 5 INJECTION INTRAVENOUS at 09:48

## 2019-01-17 RX ADMIN — ENOXAPARIN SODIUM 80 MG: 80 INJECTION, SOLUTION INTRAVENOUS; SUBCUTANEOUS at 13:10

## 2019-01-17 RX ADMIN — CEFTRIAXONE 1 G: 1 INJECTION, POWDER, FOR SOLUTION INTRAMUSCULAR; INTRAVENOUS at 06:35

## 2019-01-17 NOTE — PROGRESS NOTES
Awaiting tumor markers  I am asking radiology about he utility of MRI in evaluating the testicular lesions

## 2019-01-17 NOTE — PROGRESS NOTES
GI Progress Note (for Shahab Morse)  NAME:Tejas Spain SKP:[birth year] HIQ:[id number]   Prim GI: Ignacio Romeo MD  PCP: García Myers MD  Date/Time:  1/17/2019 7:54 AM   Assessment:   1. PVT; appears acute vs sub-acute (clearly new from from 6/18). I personally reviewed imaging with Radiology who feels likely acute vs sub-acute  2. Testicular mass s/p Urology evaluation  3. Elevated LFT's possibly 2/2 #1 (improving)  4. No e/o cirrhosis or portal hypertension on labs; INR normal  5. Unclear etiology of PVT. Possibly related to recent cholecystectomy? No inflammatory process seen in abdomen  6. Viral hepatitis serologies negative  7. No e/o biliary obstruction on U/S     Plan:   - agree with systemic anticoagulation; pt likely needs 6 month course. Will defer definitive recommendation on duration of therapy and agent to Hematology  - Hematology consult/Dr. Marino Reason following. Given unclear reason for PVT pt may need hypercoagulable workup?  - given it is a branch of the Portal Vein (left branch) in my discussion with Radiology would not recommend catheter directed lysis. In addition, pt has no e/o intestinal ischemia  - SARIAH/ASMA/AMA pending     Subjective:   Pt feels great w/o complaints. Complaint Y/N Description   Abdominal Pain     Hematemesis     Hematochezia     Melena     Constipation     Diarrhea     Dyspepsia     Dysphagia     Jaundiced     Nausea/vomiting       Review of Systems:  Symptom Y/N Comments  Symptom Y/N Comments   Fever/Chills    Chest Pain     Cough    Headaches     Sputum    Joint Pain     SOB/GARCIA    Pruritis/Rash     Tolerating Diet    Other       Could NOT obtain due to:      Objective:   VITALS:   Last 24hrs VS reviewed since prior progress note.  Most recent are:  Visit Vitals  /68   Pulse (!) 56   Temp 98.6 °F (37 °C)   Resp 18   Ht 5' 7\" (1.702 m)   Wt 75 kg (165 lb 5.5 oz)   SpO2 99%   BMI 25.90 kg/m²       Intake/Output Summary (Last 24 hours) at 1/17/2019 8219  Last data filed at 1/17/2019 0317  Gross per 24 hour   Intake --   Output 150 ml   Net -150 ml     PHYSICAL EXAM:  General: WD, WN. Alert, cooperative, no acute distress    HEENT: NC, Atraumatic. PERRLA, EOMI. Anicteric sclerae. Lungs:  CTA Bilaterally. No Wheezing/Rhonchi/Rales. Heart:  Regular  rhythm,  No murmur (), No Rubs, No Gallops  Abdomen: Soft, Non distended, Non tender.  +Bowel sounds, no HSM  Extremities: No c/c/e  Neurologic:  Alert and oriented X 3. No acute neurological distress     Lab and Radiology Data Reviewed: (see below)    Medications Reviewed: (see below)  PMH/SH reviewed - no change compared to H&P  ________________________________________________________________________  Care Plan discussed with:  Patient x   Family     RN               Consultant:       Rosanne Pacheco MD     Procedures: see electronic medical records for all procedures/Xrays and details which were not copied into this note but were reviewed prior to creation of Plan. LABS:  Recent Labs     01/17/19  0436 01/16/19  1055   WBC 6.2 5.5   HGB 11.8* 12.0*   HCT 35.6* 35.4*    248     Recent Labs     01/17/19  0436 01/15/19  2354    139   K 3.8 4.1    105   CO2 25 27   BUN 10 15   CREA 1.06 1.16   GLU 89 88   CA 8.7 8.7     Recent Labs     01/17/19  0436 01/15/19  2354   SGOT 150* 190*   * 141*   TP 6.9 7.2   ALB 2.9* 3.2*   GLOB 4.0 4.0   LPSE  --  160     Recent Labs     01/17/19  0435 01/16/19  2128 01/16/19  1055 01/16/19  0351   INR 1.1  --   --  1.0   PTP 11.7*  --   --  10.7   APTT >130.0* 36.7* 35.5* 30.2      No results for input(s): FE, TIBC, PSAT, FERR in the last 72 hours. No results found for: FOL, RBCF  No results for input(s): PH, PCO2, PO2 in the last 72 hours. No results for input(s): CPK, CKMB in the last 72 hours.     No lab exists for component: TROPONINI  Lab Results   Component Value Date/Time    Color DARK YELLOW 01/16/2019 12:02 AM    Appearance CLEAR 01/16/2019 12:02 AM    Specific gravity 1.023 01/16/2019 12:02 AM    pH (UA) 5.5 01/16/2019 12:02 AM    Protein NEGATIVE  01/16/2019 12:02 AM    Glucose NEGATIVE  01/16/2019 12:02 AM    Ketone NEGATIVE  01/16/2019 12:02 AM    Bilirubin NEGATIVE  06/11/2018 01:48 PM    Urobilinogen 1.0 01/16/2019 12:02 AM    Nitrites NEGATIVE  01/16/2019 12:02 AM    Leukocyte Esterase NEGATIVE  01/16/2019 12:02 AM    Epithelial cells FEW 06/11/2018 01:48 PM    Bacteria NEGATIVE  06/11/2018 01:48 PM    WBC 0-4 06/11/2018 01:48 PM    RBC 5-10 06/11/2018 01:48 PM       MEDICATIONS:  Current Facility-Administered Medications   Medication Dose Route Frequency    cefTRIAXone (ROCEPHIN) 1 g in 0.9% sodium chloride (MBP/ADV) 50 mL  1 g IntraVENous DAILY    heparin 25,000 units in D5W 250 ml infusion  18-36 Units/kg/hr IntraVENous TITRATE    heparin (porcine) injection 6,000 Units  80 Units/kg IntraVENous PRN    heparin (porcine) injection 3,000 Units  40 Units/kg IntraVENous PRN

## 2019-01-17 NOTE — PROGRESS NOTES
Hematology Oncology Progress Note    Follow up for: Portal vein thrombosis    Chart notes reviewed since last visit. Case discussed with following:     Patient complains of the following: No complaints    Additional concerns noted by the staff:     Patient Vitals for the past 24 hrs:   BP Temp Pulse Resp SpO2   01/17/19 0806 111/66 98.8 °F (37.1 °C) (!) 56 18 --   01/17/19 0531 117/68 98.6 °F (37 °C) (!) 56 18 99 %   01/17/19 0110 138/79 98.7 °F (37.1 °C) 64 18 99 %   01/16/19 2040 141/64 99.2 °F (37.3 °C) 60 18 100 %   01/16/19 1551 134/83 99.9 °F (37.7 °C) 61 16 97 %   01/16/19 1113 116/64 98.5 °F (36.9 °C) 61 16 98 %   01/16/19 1015 116/69 -- -- -- 97 %   01/16/19 1000 120/70 -- -- -- 98 %       Review of Systems:  12 point ROS done and negative except as above    Physical Examination:  Gen NAD  CV reg  Lungs clear  abd benign    Labs:  Recent Results (from the past 24 hour(s))   PTT    Collection Time: 01/16/19 10:55 AM   Result Value Ref Range    aPTT 35.5 (H) 22.1 - 32.0 sec    aPTT, therapeutic range     58.0 - 77.0 SECS   CBC WITH AUTOMATED DIFF    Collection Time: 01/16/19 10:55 AM   Result Value Ref Range    WBC 5.5 4.1 - 11.1 K/uL    RBC 4.19 4. 10 - 5.70 M/uL    HGB 12.0 (L) 12.1 - 17.0 g/dL    HCT 35.4 (L) 36.6 - 50.3 %    MCV 84.5 80.0 - 99.0 FL    MCH 28.6 26.0 - 34.0 PG    MCHC 33.9 30.0 - 36.5 g/dL    RDW 15.2 (H) 11.5 - 14.5 %    PLATELET 979 145 - 619 K/uL    MPV 9.6 8.9 - 12.9 FL    NRBC 0.0 0  WBC    ABSOLUTE NRBC 0.00 0.00 - 0.01 K/uL    NEUTROPHILS 68 32 - 75 %    LYMPHOCYTES 21 12 - 49 %    MONOCYTES 8 5 - 13 %    EOSINOPHILS 3 0 - 7 %    BASOPHILS 0 0 - 1 %    IMMATURE GRANULOCYTES 0 0.0 - 0.5 %    ABS. NEUTROPHILS 3.7 1.8 - 8.0 K/UL    ABS. LYMPHOCYTES 1.1 0.8 - 3.5 K/UL    ABS. MONOCYTES 0.4 0.0 - 1.0 K/UL    ABS. EOSINOPHILS 0.2 0.0 - 0.4 K/UL    ABS. BASOPHILS 0.0 0.0 - 0.1 K/UL    ABS. IMM.  GRANS. 0.0 0.00 - 0.04 K/UL    DF AUTOMATED     PTT    Collection Time: 01/16/19  9:28 PM   Result Value Ref Range    aPTT 36.7 (H) 22.1 - 32.0 sec    aPTT, therapeutic range     58.0 - 77.0 SECS   PTT    Collection Time: 01/17/19  4:35 AM   Result Value Ref Range    aPTT >130.0 (HH) 22.1 - 32.0 sec    aPTT, therapeutic range     58.0 - 77.0 SECS   PROTHROMBIN TIME + INR    Collection Time: 01/17/19  4:35 AM   Result Value Ref Range    INR 1.1 0.9 - 1.1      Prothrombin time 11.7 (H) 9.0 - 11.1 sec   HEPATIC FUNCTION PANEL    Collection Time: 01/17/19  4:36 AM   Result Value Ref Range    Protein, total 6.9 6.4 - 8.2 g/dL    Albumin 2.9 (L) 3.5 - 5.0 g/dL    Globulin 4.0 2.0 - 4.0 g/dL    A-G Ratio 0.7 (L) 1.1 - 2.2      Bilirubin, total 1.8 (H) 0.2 - 1.0 MG/DL    Bilirubin, direct 1.1 (H) 0.0 - 0.2 MG/DL    Alk. phosphatase 128 (H) 45 - 117 U/L    AST (SGOT) 150 (H) 15 - 37 U/L    ALT (SGPT) 356 (H) 12 - 78 U/L   CBC W/O DIFF    Collection Time: 01/17/19  4:36 AM   Result Value Ref Range    WBC 6.2 4.1 - 11.1 K/uL    RBC 4.25 4. 10 - 5.70 M/uL    HGB 11.8 (L) 12.1 - 17.0 g/dL    HCT 35.6 (L) 36.6 - 50.3 %    MCV 83.8 80.0 - 99.0 FL    MCH 27.8 26.0 - 34.0 PG    MCHC 33.1 30.0 - 36.5 g/dL    RDW 15.1 (H) 11.5 - 14.5 %    PLATELET 550 653 - 149 K/uL    MPV 9.7 8.9 - 12.9 FL    NRBC 0.0 0  WBC    ABSOLUTE NRBC 0.00 0.00 - 5.82 K/uL   METABOLIC PANEL, BASIC    Collection Time: 01/17/19  4:36 AM   Result Value Ref Range    Sodium 137 136 - 145 mmol/L    Potassium 3.8 3.5 - 5.1 mmol/L    Chloride 105 97 - 108 mmol/L    CO2 25 21 - 32 mmol/L    Anion gap 7 5 - 15 mmol/L    Glucose 89 65 - 100 mg/dL    BUN 10 6 - 20 MG/DL    Creatinine 1.06 0.70 - 1.30 MG/DL    BUN/Creatinine ratio 9 (L) 12 - 20      GFR est AA >60 >60 ml/min/1.73m2    GFR est non-AA >60 >60 ml/min/1.73m2    Calcium 8.7 8.5 - 10.1 MG/DL   SAMPLES BEING HELD    Collection Time: 01/17/19  4:36 AM   Result Value Ref Range    SAMPLES BEING HELD BL     COMMENT        Add-on orders for these samples will be processed based on acceptable specimen integrity and analyte stability, which may vary by analyte.    PTT    Collection Time: 01/17/19  8:20 AM   Result Value Ref Range    aPTT 73.9 (H) 22.1 - 32.0 sec    aPTT, therapeutic range     58.0 - 77.0 SECS     Assessment and Plan:  Portal vein thrombosis  -will see what workup shows for his testicular masses, to see how long he may need anticoagulation  -Cont anticoagulation  -would consider perhaps switching him to lovenox until we are sure he does not need any procedures  -if we do not find any malignancy will then send hypercoag w/u    Testicular masses  -Tumor markers were not sent yesterday, have spoken to nurse and they will be sent today  -Urology saw patient and said he can f/u with them as outpatient for further eval.

## 2019-01-17 NOTE — PROGRESS NOTES
Problem: Falls - Risk of  Goal: *Absence of Falls  Document Cody Fall Risk and appropriate interventions in the flowsheet.   Outcome: Progressing Towards Goal  Fall Risk Interventions:

## 2019-01-18 VITALS
RESPIRATION RATE: 16 BRPM | DIASTOLIC BLOOD PRESSURE: 78 MMHG | HEIGHT: 67 IN | TEMPERATURE: 97.7 F | WEIGHT: 165.34 LBS | HEART RATE: 55 BPM | OXYGEN SATURATION: 95 % | SYSTOLIC BLOOD PRESSURE: 120 MMHG | BODY MASS INDEX: 25.95 KG/M2

## 2019-01-18 LAB
ACTIN IGG SERPL-ACNC: 21 UNITS (ref 0–19)
AFP-TM SERPL-MCNC: 7.3 NG/ML (ref 0–8.3)
ALBUMIN SERPL-MCNC: 2.7 G/DL (ref 3.5–5)
ALBUMIN/GLOB SERPL: 0.7 {RATIO} (ref 1.1–2.2)
ALP SERPL-CCNC: 122 U/L (ref 45–117)
ALT SERPL-CCNC: 330 U/L (ref 12–78)
ANA SER QL: NEGATIVE
APTT PPP: 40.7 SEC (ref 22.1–32)
AST SERPL-CCNC: 146 U/L (ref 15–37)
BILIRUB DIRECT SERPL-MCNC: 0.9 MG/DL (ref 0–0.2)
BILIRUB SERPL-MCNC: 1.3 MG/DL (ref 0.2–1)
GLOBULIN SER CALC-MCNC: 4 G/DL (ref 2–4)
MITOCHONDRIA M2 IGG SER-ACNC: 40 UNITS (ref 0–20)
PROT SERPL-MCNC: 6.7 G/DL (ref 6.4–8.2)
THERAPEUTIC RANGE,PTTT: ABNORMAL SECS (ref 58–77)

## 2019-01-18 PROCEDURE — 85730 THROMBOPLASTIN TIME PARTIAL: CPT

## 2019-01-18 PROCEDURE — 74011000258 HC RX REV CODE- 258: Performed by: INTERNAL MEDICINE

## 2019-01-18 PROCEDURE — 74011250637 HC RX REV CODE- 250/637: Performed by: INTERNAL MEDICINE

## 2019-01-18 PROCEDURE — 74011250636 HC RX REV CODE- 250/636: Performed by: INTERNAL MEDICINE

## 2019-01-18 PROCEDURE — 36415 COLL VENOUS BLD VENIPUNCTURE: CPT

## 2019-01-18 PROCEDURE — 80076 HEPATIC FUNCTION PANEL: CPT

## 2019-01-18 RX ORDER — ENOXAPARIN SODIUM 100 MG/ML
1 INJECTION SUBCUTANEOUS EVERY 12 HOURS
Qty: 48 ML | Refills: 1 | Status: SHIPPED | OUTPATIENT
Start: 2019-01-18 | End: 2019-01-18

## 2019-01-18 RX ORDER — CEFUROXIME AXETIL 500 MG/1
500 TABLET ORAL 2 TIMES DAILY
Qty: 14 TAB | Refills: 0 | Status: SHIPPED | OUTPATIENT
Start: 2019-01-18 | End: 2019-01-25

## 2019-01-18 RX ORDER — GUAIFENESIN 100 MG/5ML
81 LIQUID (ML) ORAL DAILY
Qty: 30 TAB | Refills: 1 | Status: SHIPPED | OUTPATIENT
Start: 2019-01-18

## 2019-01-18 RX ORDER — HYDROCODONE BITARTRATE AND ACETAMINOPHEN 5; 325 MG/1; MG/1
1 TABLET ORAL
Qty: 30 TAB | Refills: 0 | Status: ON HOLD | OUTPATIENT
Start: 2019-01-18 | End: 2022-09-01

## 2019-01-18 RX ADMIN — FINASTERIDE 5 MG: 5 TABLET, FILM COATED ORAL at 08:18

## 2019-01-18 RX ADMIN — CEFTRIAXONE 1 G: 1 INJECTION, POWDER, FOR SOLUTION INTRAMUSCULAR; INTRAVENOUS at 06:14

## 2019-01-18 RX ADMIN — ENOXAPARIN SODIUM 80 MG: 80 INJECTION, SOLUTION INTRAVENOUS; SUBCUTANEOUS at 00:30

## 2019-01-18 RX ADMIN — Medication 10 ML: at 06:15

## 2019-01-18 RX ADMIN — Medication 10 ML: at 13:20

## 2019-01-18 RX ADMIN — ASPIRIN 81 MG 81 MG: 81 TABLET ORAL at 08:18

## 2019-01-18 RX ADMIN — FENOFIBRATE 145 MG: 145 TABLET ORAL at 08:18

## 2019-01-18 RX ADMIN — ENOXAPARIN SODIUM 80 MG: 80 INJECTION, SOLUTION INTRAVENOUS; SUBCUTANEOUS at 11:48

## 2019-01-18 RX ADMIN — TAMSULOSIN HYDROCHLORIDE 0.4 MG: 0.4 CAPSULE ORAL at 08:18

## 2019-01-18 NOTE — PROGRESS NOTES
AFP normal  Awaiting HCG  Plan outpatient MRI  Can be discharged from [facility] ([facility]) standpoint

## 2019-01-18 NOTE — PROGRESS NOTES
General Surgery End of Shift Nursing Note Bedside shift change report given to *** (oncoming nurse) by Marcela Land (offgoing nurse). Report included the following information SBAR, Kardex, Intake/Output and MAR. Shift worked:   7a-7p Summary of shift:    Patient did not need anything. Issues for physician to address:   None Number times ambulated in hallway past shift: 0 Number of times OOB to chair past shift: 0 Pain Management: 
Current medication: See [facility] Patient states pain is manageable on current pain medication: YES-has not requested pain medication GI: 
 
Current diet:  DIET CARDIAC Regular Tolerating current diet: YES Passing flatus: YES Last Bowel Movement: yesterday Appearance:  
 
Respiratory: 
 
Incentive Spirometer at bedside: YES Patient instructed on use: YES Patient Safety: 
 
Falls Score: 1 Bed Alarm On? No 
Sitter? No 
 
Brooke Arnold

## 2019-01-18 NOTE — PROGRESS NOTES
General Surgery End of Shift Nursing Note    Bedside shift change report given to Long Ed[place] 68 (oncoming nurse) by Lujean Felty RN (offgoing nurse). Report included the following information SBAR, Kardex, Intake/Output, MAR and Recent Results. Shift worked:   7p-7a   Summary of shift:    Pt slept most of the shift. Uneventful evening. Issues for physician to address:   N/A     Number times ambulated in hallway past shift: 0    Number of times OOB to chair past shift: 3    Pain Management:  Current medication: Tylenol  Patient states pain is manageable on current pain medication: YES    GI:    Current diet:  DIET CARDIAC Regular    Tolerating current diet: YES  Passing flatus: NO  Last Bowel Movement: yesterday    Respiratory:    Incentive Spirometer at bedside: YES  Patient instructed on use: YES    Patient Safety:    Falls Score: 1  Bed Alarm On? No  Sitter?  No    Zahida Garcia RN

## 2019-01-18 NOTE — ROUTINE PROCESS
Pt's IV discontinued as pt is being discharged. Catheter tip intact, no redness or swelling noted at insertion site. Band-aid applied. Pt discharged per MD order. Pt has all personal belongings, all prescriptions, and discharge instructions. Discharge instructions gone over with pt and pt's wife. Pt does not have any further questions regarding discharge. Pt getting dressed and will be discharged after.

## 2019-01-18 NOTE — PROGRESS NOTES
Hematology Oncology Progress Note    Follow up for: Portal vein thrombosis    Chart notes reviewed since last visit. Case discussed with following:     Patient complains of the following: No complaints    Additional concerns noted by the staff:     Patient Vitals for the past 24 hrs:   BP Temp Pulse Resp SpO2   01/18/19 0828 118/68 98.7 °F (37.1 °C) (!) 56 16 98 %   01/18/19 0400 120/62 98.6 °F (37 °C) 66 16 98 %   01/17/19 2335 116/61 98.9 °F (37.2 °C) 65 16 98 %   01/17/19 2029 128/72 98.4 °F (36.9 °C) (!) 54 16 98 %   01/17/19 1620 137/80 99.1 °F (37.3 °C) (!) 53 16 97 %   01/17/19 1058 115/74 99 °F (37.2 °C) (!) 55 18 97 %       Review of Systems:  12 point ROS done and negative except as above    Physical Examination:  Gen NAD  CV reg  Lungs clear  abd benign    Labs:  Recent Results (from the past 24 hour(s))   LD    Collection Time: 01/17/19 10:11 AM   Result Value Ref Range     85 - 241 U/L   AFP, TUMOR MARKER    Collection Time: 01/17/19 10:11 AM   Result Value Ref Range    AFP, Serum, Tumor Marker 7.3 0.0 - 8.3 ng/mL   HEPATIC FUNCTION PANEL    Collection Time: 01/18/19  4:50 AM   Result Value Ref Range    Protein, total 6.7 6.4 - 8.2 g/dL    Albumin 2.7 (L) 3.5 - 5.0 g/dL    Globulin 4.0 2.0 - 4.0 g/dL    A-G Ratio 0.7 (L) 1.1 - 2.2      Bilirubin, total 1.3 (H) 0.2 - 1.0 MG/DL    Bilirubin, direct 0.9 (H) 0.0 - 0.2 MG/DL    Alk.  phosphatase 122 (H) 45 - 117 U/L    AST (SGOT) 146 (H) 15 - 37 U/L    ALT (SGPT) 330 (H) 12 - 78 U/L   PTT    Collection Time: 01/18/19  4:50 AM   Result Value Ref Range    aPTT 40.7 (H) 22.1 - 32.0 sec    aPTT, therapeutic range     58.0 - 77.0 SECS     Assessment and Plan:  Portal vein thrombosis  -will see what workup shows for his testicular masses, to see how long he may need anticoagulation  -Cont anticoagulation on lovenox  -if we do not find any malignancy will then send hypercoag w/u  -Have him f/u with me in about 2 weeks after he has seen urology    Testicular masses  -Tumor markers   So far are negative, awaiting AFP  -Urology saw patient and said he can f/u with them as outpatient for further eval.

## 2019-01-18 NOTE — PROGRESS NOTES
11: 49AM  D/c order and CM consult for Lovenox pricing acknowledged. PC to pt's pharmacy. Lovenox priced at $700/mo. CM PC to attending. Rx provided for Eliquis. PC to pt's pharmacy. Eliquis priced at $46/mo. Pt notified of pricing and given 30 day free trial. PCP appt scheduled and on AVS. Pt to d/c home with family. CM available for any additional needs.      Jose Madison, MSW  Care Manager

## 2019-01-18 NOTE — PROGRESS NOTES
Hospitalist Progress Note    NAME: Anat Villela   :     MRN:  [id number]       Assessment / Plan:     Bilateral Testicular Mass with left Hydrocele  Appreciate urology and heme/onc consults  Intermittent b/l testicular swelling and abd pain  B/l testicular masses on US  Low suspicion testicular CA per urology, tumor markers pending    Portal vein thrombosis  Incidental finding on CTA/P  Appreciate GI and Heme/Onc input  No indication for catheter directed lysis  System [facility] [place] appropriate  Case management consulted for approval of Tx with self admin lovenox in case biopsy needed. Follow with case mangement  Discussed with Dr Sachin Yadav, hypercoag workup if tumor markers negative    Abnormal LFT  Hepatitis panel nonreactive  No biliary obsxn on US  Mild tbili elevation  May relate to PVT  monitor    H/o Nephrolithiasis  Outpatient f/u with urology    Chololithiasis  stable    Hyperlipidemia   check lipid Panel    DVT Prophylaxis  Full dose lovenox    Code: Full  Decision surrogate: wife: Randolph Shaffer [phone]     Subjective:     Chief Complaint / Reason for Physician Visit  Ongoing testicular swelling. Mild abd pain, intermittent. No n/v    Review of Systems:  Symptom Y/N Comments  Symptom Y/N Comments   Fever/Chills n   Chest Pain n    Poor Appetite n   Edema y scrotum   Cough n   Abdominal Pain n    Sputum n   Joint Pain n    SOB/GARCIA n   Pruritis/Rash     Nausea/vomit n   Tolerating PT/OT     Diarrhea n   Tolerating Diet y    Constipation n   Other       Could NOT obtain due to:      Objective:     VITALS:   Last 24hrs VS reviewed since prior progress note.  Most recent are:  Patient Vitals for the past 24 hrs:   Temp Pulse Resp BP SpO2   19 98.4 °F (36.9 °C) (!) 54 16 128/72 98 %   19 1620 99.1 °F (37.3 °C) (!) 53 16 137/80 97 %   19 1058 99 °F (37.2 °C) (!) 55 18 115/74 97 %   19 0806 98.8 °F (37.1 °C) (!) 56 18 111/66 --   19 0531 98.6 °F (37 °C) (!) 56 18 117/68 99 %   01/17/19 0110 98.7 °F (37.1 °C) 64 18 138/79 99 %       Intake/Output Summary (Last 24 hours) at 1/17/2019 2255  Last data filed at 1/17/2019 1943  Gross per 24 hour   Intake 910.94 ml   Output 1230 ml   Net -319.06 ml        PHYSICAL EXAM:  General: WD, WN. Alert, cooperative, no acute distress    EENT:  EOMI. Anicteric sclerae. MMM  Resp:  CTA bilaterally, no wheezing or rales. No accessory muscle use  CV:  Regular  rhythm,  No edema  GI:  Soft, Non distended, Non tender.  +Bowel sounds  Neurologic:  Alert and oriented X 3, normal speech,   Psych:   Good insight. Not anxious nor agitated  Skin:  No rashes. No jaundice    Reviewed most current lab test results and cultures  YES  Reviewed most current radiology test results   YES  Review and summation of old records today    NO  Reviewed patient's current orders and MAR    YES  PMH/SH reviewed - no change compared to H&P  ________________________________________________________________________  Care Plan discussed with:    Comments   Patient x    Family      RN x    Care Manager     Consultant                       x Multidiciplinary team rounds were held today with , nursing, pharmacist and clinical coordinator. Patient's plan of care was discussed; medications were reviewed and discharge planning was addressed. ________________________________________________________________________  Total NON critical care TIME:  25   Minutes    Total CRITICAL CARE TIME Spent:   Minutes non procedure based      Comments   >50% of visit spent in counseling and coordination of care x    ________________________________________________________________________  Lucy Bright,      Procedures: see electronic medical records for all procedures/Xrays and details which were not copied into this note but were reviewed prior to creation of Plan. LABS:  I reviewed today's most current labs and imaging studies.   Pertinent labs include:  Recent Labs 01/17/19 0436 01/16/19  1055 01/15/19  2354   WBC 6.2 5.5 5.7   HGB 11.8* 12.0* 12.9   HCT 35.6* 35.4* 38.7    248 267     Recent Labs     01/17/19 0436 01/17/19 0435 01/16/19  0351 01/15/19  2354     --   --  139   K 3.8  --   --  4.1     --   --  105   CO2 25  --   --  27   GLU 89  --   --  88   BUN 10  --   --  15   CREA 1.06  --   --  1.16   CA 8.7  --   --  8.7   ALB 2.9*  --   --  3.2*   TBILI 1.8*  --   --  2.3*   SGOT 150*  --   --  190*   *  --   --  473*   INR  --  1.1 1.0  --        Signed: Teresa Jay, DO

## 2019-01-18 NOTE — DISCHARGE SUMMARY
Hospitalist Discharge Summary     Patient ID:  Yahaira Sams  [id number]  23 y.o.  1950    PCP on record: Mary Jennings MD    Admit date: 1/15/2019  Discharge date and time: 1/18/2019      DISCHARGE DIAGNOSIS:    B/L testicular Masses, Portal Vein Thrombosis, Hyperlipidemia, Increased LFT      CONSULTATIONS:  IP CONSULT TO GASTROENTEROLOGY  IP CONSULT TO HEMATOLOGY  IP CONSULT TO UROLOGY    Excerpted HPI from H&P of Shira Montenegro MD:  P[age] Axon y. o. male with PMHx significant for hypercholesterolemia, kidney stones, arm and leg swelling, and enlarged prostate, presents ambulatory to the ED with cc of new onset swelling of his right hand and jaw since Saturday (1/12/19) and scrotum since this evening. Pt also notes chronic arm and leg swelling. Pt notes that he has a PMHx of arm and leg swelling in the past, but notes that he never had hand, jaw, and scrotum swelling. In addition, he notes lower abd pain and lower back pain when he tries to breath in or cough. He notes he take medication for benign prostate enlargement. He notes having kidney stone in the past but denies any chronic kidney problems or chronic liver problems. He denies any blood pressure medicine. He denies consumption of overly salty foods. He specifically denies fever, SOB, and weight change.   There are no other complaints, changes, or physical findings at this time  ______________________________________________________________________  DISCHARGE SUMMARY/HOSPITAL COURSE:  for full details see H&P, daily progress notes, labs, consult notes. Bilateral Testicular Mass with left Hydrocele  Appreciate urology and heme/onc consults, F/U tumor markers  Intermittent b/l testicular swelling and abd pain  B/l testicular masses on US  Low suspicion testicular CA per urology, tumor markers pending  Need formal plan from Urology for F/U or if they plan to do biopsy/orchiectomy while in house? ?   Portal vein thrombosis  Incidental finding on CTA/P  Appreciate GI and Heme/Onc input  No indication for catheter directed lysis  System AC appropriate  Unfortunately lovenox is expensive and can not be afforded, will D/c on Eliquis  Discussed with Dr Francisca Macdonald, hypercoag workup if tumor markers negative  Abnormal LFT  Hepatitis panel nonreactive  No biliary obsxn on US  Mild tbili elevation  May relate to PVT, D/c Statin for now  monitor  H/o Nephrolithiasis  Outpatient f/u with urology  Chololithiasis  S/p Cholecystectomy in 06/2018  Hyperlipidemia  Hold statin for now  DVT Prophylaxis  Full dose lovenox  Code: Full  Decision surrogate: wife: Ulises Baires [phone]     D/C Home today and F/U with PCP, GI, Oncology and Urology as outpatient    _______________________________________________________________________  Patient seen and examined by me on discharge day. Pertinent Findings:  Gen:    Not in distress  Chest: Clear lungs  CVS:   Regular rhythm. No edema  Abd:  Soft, not distended, not tender  Neuro:  Alert, GCS 15  _______________________________________________________________________  DISCHARGE MEDICATIONS:   Current Discharge Medication List      START taking these medications    Details   cefUROXime (CEFTIN) 500 mg tablet Take 1 Tab by mouth two (2) times a day for 7 days. Qty: 14 Tab, Refills: 0      !! apixaban (ELIQUIS) 5 mg tablet Take 2 Tabs by mouth two (2) times a day for 7 days. Qty: 28 Tab, Refills: 0      !! apixaban (ELIQUIS) 5 mg tablet Take 1 Tab by mouth two (2) times a day. Start on 01/26/19 after finishing 10mg BID  Qty: 60 Tab, Refills: 1       !! - Potential duplicate medications found. Please discuss with provider. CONTINUE these medications which have CHANGED    Details   aspirin 81 mg chewable tablet Take 1 Tab by mouth daily. Qty: 30 Tab, Refills: 1      HYDROcodone-acetaminophen (NORCO) 5-325 mg per tablet Take 1 Tab by mouth every four (4) hours as needed.  Max Daily Amount: 6 Tabs.  Qty: 30 Tab, Refills: 0    Associated Diagnoses: Testicular mass         CONTINUE these medications which have NOT CHANGED    Details   multivitamin (ONE A DAY) tablet Take 1 Tab by mouth daily. finasteride (PROSCAR) 5 mg tablet Take 5 mg by mouth daily. tamsulosin (FLOMAX) 0.4 mg capsule Take 0.4 mg by mouth daily. STOP taking these medications       Fenofibrate 120 mg tab Comments:   Reason for Stopping:         SIMVASTATIN PO Comments:   Reason for Stopping:               My Recommended Diet, Activity, Wound Care, and follow-up labs are listed in the patient's Discharge Insturctions which I have personally completed and reviewed.     ______________________________________________________________________    Risk of deterioration: Moderate    Condition at Discharge:  Stable  ______________________________________________________________________    Disposition  Home with family, no needs  ______________________________________________________________________    Care Plan discussed with:   Patient, Family, RN, Care Manager, Consultant    ______________________________________________________________________    Code Status: Full Code  ______________________________________________________________________      Follow up with:   PCP : Ash Vásquez MD  Follow-up Information     Follow up With Specialties Details Why Contact Info    Ash Vásquez MD Family Practice Go on 1/25/2019 Hospital follow-up scheduled at 10:30am ( If you have questions or need to reschedule please call [phone], [address]  [facility]  [phone]      [facility] EMERGENCY DEPT Emergency Medicine   [address] [place] [address] [address]   [address] [facility] [address] Raf Voss MD Gastroenterology Schedule an appointment as soon as possible for a visit in 1 week [address]  [phone] Reece Huitron MD Urology Schedule an appointment as soon as possible for a visit in 1 week [address]      Sona Copeland MD Hematology and Oncology, Hematology, Oncology Schedule an appointment as soon as possible for a visit in 1 week [address] [facility] [address].  [phone]          F/U PCP  F/U GI  F/U Urology  FU Oncology    Total time in minutes spent coordinating this discharge (includes going over instructions, follow-up, prescriptions, and preparing report for sign off to her PCP) :  35 minutes    Signed:  Agustin Agarwal MD

## 2019-01-18 NOTE — PROGRESS NOTES
Hospitalist Progress Note    NAME: Cherry Caldwell   :     MRN:  [id number]       Assessment / Plan:  Bilateral Testicular Mass with left Hydrocele  Appreciate urology and heme/onc consults, F/U tumor markers  Intermittent b/l testicular swelling and abd pain  B/l testicular masses on US  Low suspicion testicular CA per urology, tumor markers pending  Need formal plan from Urology for F/U or if they plan to do biopsy/orchiectomy while in house? ? Portal vein thrombosis  Incidental finding on CTA/P  Appreciate GI and Heme/Onc input  No indication for catheter directed lysis  System [facility] appropriate  Case management consulted for approval of Tx with self admin lovenox in case biopsy needed. Follow with case management, lovenox teaching to be done by pharmacy today  Discussed with Dr Ulises Magana, hypercoag workup if tumor markers negative  Abnormal LFT  Hepatitis panel nonreactive  No biliary obsxn on US  Mild tbili elevation  May relate to PVT, D/c Statin for now  monitor  H/o Nephrolithiasis  Outpatient f/u with urology  Chololithiasis  S/p Cholecystectomy in 2018  Hyperlipidemia  Hold statin for now  DVT Prophylaxis  Full dose lovenox  Code: Full  Decision surrogate: wife: Jayde Query [phone]    D/C plan for tomorrow, consultants to give final recommendations and F/U     Subjective:     Chief Complaint / Reason for Physician Visit  \"I feel OK\"    Review of Systems:  Symptom Y/N Comments  Symptom Y/N Comments   Fever/Chills n   Chest Pain n    Poor Appetite n   Edema y scrotum   Cough n   Abdominal Pain n    Sputum n   Joint Pain n    SOB/GARCIA n   Pruritis/Rash     Nausea/vomit n   Tolerating PT/OT     Diarrhea n   Tolerating Diet y    Constipation n   Other       Could NOT obtain due to:      Objective:     VITALS:   Last 24hrs VS reviewed since prior progress note.  Most recent are:  Patient Vitals for the past 24 hrs:   Temp Pulse Resp BP SpO2   19 0828 98.7 °F (37.1 °C) (!) 56 16 118/68 98 %   01/18/19 0400 98.6 °F (37 °C) 66 16 120/62 98 %   01/17/19 2335 98.9 °F (37.2 °C) 65 16 116/61 98 %   01/17/19 2029 98.4 °F (36.9 °C) (!) 54 16 128/72 98 %   01/17/19 1620 99.1 °F (37.3 °C) (!) 53 16 137/80 97 %   01/17/19 1058 99 °F (37.2 °C) (!) 55 18 115/74 97 %       Intake/Output Summary (Last 24 hours) at 1/18/2019 0838  Last data filed at 1/18/2019 0659  Gross per 24 hour   Intake 800.94 ml   Output 1130 ml   Net -329.06 ml        PHYSICAL EXAM:  General: WD, WN. Alert, cooperative, no acute distress    EENT:  EOMI. Anicteric sclerae. MMM  Resp:  CTA bilaterally, no wheezing or rales. No accessory muscle use  CV:  Regular  rhythm,  No edema  GI:  Soft, Non distended, Non tender.  +Bowel sounds  Neurologic:  Alert and oriented X 3, normal speech,   Psych:   Good insight. Not anxious nor agitated  Skin:  No rashes. No jaundice    Reviewed most current lab test results and cultures  YES  Reviewed most current radiology test results   YES  Review and summation of old records today    NO  Reviewed patient's current orders and MAR    YES  PMH/SH reviewed - no change compared to H&P  ________________________________________________________________________  Care Plan discussed with:    Comments   Patient x    Family      RN x    Care Manager     Consultant                       x Multidiciplinary team rounds were held today with , nursing, pharmacist and clinical coordinator. Patient's plan of care was discussed; medications were reviewed and discharge planning was addressed.      ________________________________________________________________________  Total NON critical care TIME:  25   Minutes    Total CRITICAL CARE TIME Spent:   Minutes non procedure based      Comments   >50% of visit spent in counseling and coordination of care x    ________________________________________________________________________  Sridhar Mars, MD     Procedures: see electronic medical records for all procedures/Xrays and details which were not copied into this note but were reviewed prior to creation of Plan. LABS:  I reviewed today's most current labs and imaging studies.   Pertinent labs include:  Recent Labs     01/17/19 0436 01/16/19  1055 01/15/19  2354   WBC 6.2 5.5 5.7   HGB 11.8* 12.0* 12.9   HCT 35.6* 35.4* 38.7    248 267     Recent Labs     01/18/19  0450 01/17/19 0436 01/17/19 0435 01/16/19  0351 01/15/19  2354   NA  --  137  --   --  139   K  --  3.8  --   --  4.1   CL  --  105  --   --  105   CO2  --  25  --   --  27   GLU  --  89  --   --  88   BUN  --  10  --   --  15   CREA  --  1.06  --   --  1.16   CA  --  8.7  --   --  8.7   ALB 2.7* 2.9*  --   --  3.2*   TBILI 1.3* 1.8*  --   --  2.3*   SGOT 146* 150*  --   --  190*   * 356*  --   --  473*   INR  --   --  1.1 1.0  --        Signed: Brennan Edouard MD

## 2019-01-18 NOTE — PROGRESS NOTES
PCP CRISTELA appt scheduled with Dr. Cameron Day on 1/25/2019 at 10:30am. Appt added to AVS. Marilyn Hernandez CM Specialist

## 2019-01-18 NOTE — DISCHARGE INSTRUCTIONS
HOSPITALIST DISCHARGE INSTRUCTIONS  NAME: Cherry Caldwell   :     MRN:  [id number]     Date/Time:  2019 8:50 AM    ADMIT DATE: 1/15/2019     DISCHARGE DATE: 2019     DIAGNOSIS: B/L testicular Masses, Portal Vein Thrombosis, Hyperlipidemia, Increased LFT    MEDICATIONS:                As per medication reconciliation    · It is important that you take the medication exactly as they are prescribed. · Keep your medication in the bottles provided by the pharmacist and keep a list of the medication names, dosages, and times to be taken in your wallet. · Do not take other medications without consulting your doctor. Pain Management: per above medications    What to do at Home    Recommended diet:  Regular Diet    Recommended activity: Activity as tolerated, no driving while on analgesics    If you experience any of the following symptoms then please call your primary care physician or return to the emergency room if you cannot get hold of your doctor:  Fever, chills, nausea, vomiting, diarrhea, change in mentation, falling, bleeding, shortness of breath. Follow Up:  PCP you are to call and set up an appointment to see them in 1 week. F/U GI  F/U Urology  F/U Oncology      Information obtained by :  I understand that if any problems occur once I am at home I am to contact my physician. I understand and acknowledge receipt of the instructions indicated above.                                                                                                                                            Physician's or R.N.'s Signature                                                                  Date/Time                                                                                                                                              Patient or Representative Signature                                                          Date/Time

## 2019-01-19 LAB — HCG INTACT+B SERPL-ACNC: <1 MIU/ML (ref 0–3)

## 2019-01-21 LAB
BACTERIA SPEC CULT: NORMAL
SERVICE CMNT-IMP: NORMAL

## 2020-08-31 NOTE — PROGRESS NOTES
Brief GI Note:    Pt to be d/c'ed today. Agree with plans for Lovenox until any possible procedures are planned. Pt should f/u with myself in clinic.      Signed By: Aliza Talbot MD     January 18, 2019 Pharmacy re-faxed the requests.    Patient is scheduled.    SUMAtriptan (IMITREX) 100 MG tablet    Next 5 appointments (look out 90 days)    Sep 29, 2020  2:00 PM CDT  PHYSICAL with Arelis Diaz MD  [facility] ([facility]) [address] [place] [address]  [place] [address]  [phone]       Chio Petty,

## 2022-03-19 PROBLEM — K80.00 ACUTE CALCULOUS CHOLECYSTITIS: Status: ACTIVE | Noted: 2018-06-11

## 2022-03-19 PROBLEM — I81 PORTAL VEIN THROMBOSIS: Status: ACTIVE | Noted: 2019-01-16

## 2022-03-19 PROBLEM — N50.89 TESTICULAR MASS: Status: ACTIVE | Noted: 2019-01-16

## 2022-09-01 ENCOUNTER — HOSPITAL ENCOUNTER (OUTPATIENT)
Age: 72
Setting detail: OUTPATIENT SURGERY
Discharge: HOME | End: 2022-09-01
Attending: INTERNAL MEDICINE | Admitting: INTERNAL MEDICINE
Payer: MEDICARE

## 2022-09-01 ENCOUNTER — ANESTHESIA EVENT (OUTPATIENT)
Dept: ENDOSCOPY | Age: 72
End: 2022-09-01
Payer: MEDICARE

## 2022-09-01 ENCOUNTER — ANESTHESIA (OUTPATIENT)
Dept: ENDOSCOPY | Age: 72
End: 2022-09-01
Payer: MEDICARE

## 2022-09-01 VITALS
SYSTOLIC BLOOD PRESSURE: 139 MMHG | TEMPERATURE: 97.8 F | HEIGHT: 67 IN | RESPIRATION RATE: 16 BRPM | BODY MASS INDEX: 25.9 KG/M2 | WEIGHT: 165 LBS | HEART RATE: 65 BPM | OXYGEN SATURATION: 99 % | DIASTOLIC BLOOD PRESSURE: 74 MMHG

## 2022-09-01 PROCEDURE — 76040000019: Performed by: INTERNAL MEDICINE

## 2022-09-01 PROCEDURE — 76060000031 HC ANESTHESIA FIRST 0.5 HR: Performed by: INTERNAL MEDICINE

## 2022-09-01 PROCEDURE — 2709999900 HC NON-CHARGEABLE SUPPLY: Performed by: INTERNAL MEDICINE

## 2022-09-01 PROCEDURE — 74011000250 HC RX REV CODE- 250: Performed by: NURSE ANESTHETIST, CERTIFIED REGISTERED

## 2022-09-01 PROCEDURE — 74011250636 HC RX REV CODE- 250/636: Performed by: INTERNAL MEDICINE

## 2022-09-01 PROCEDURE — 74011250636 HC RX REV CODE- 250/636: Performed by: NURSE ANESTHETIST, CERTIFIED REGISTERED

## 2022-09-01 RX ORDER — DEXMEDETOMIDINE HYDROCHLORIDE 100 UG/ML
INJECTION, SOLUTION INTRAVENOUS AS NEEDED
Status: DISCONTINUED | OUTPATIENT
Start: 2022-09-01 | End: 2022-09-01 | Stop reason: HOSPADM

## 2022-09-01 RX ORDER — EPINEPHRINE 0.1 MG/ML
1 INJECTION INTRACARDIAC; INTRAVENOUS
Status: DISCONTINUED | OUTPATIENT
Start: 2022-09-01 | End: 2022-09-01 | Stop reason: HOSPADM

## 2022-09-01 RX ORDER — FLUMAZENIL 0.1 MG/ML
0.2 INJECTION INTRAVENOUS
Status: DISCONTINUED | OUTPATIENT
Start: 2022-09-01 | End: 2022-09-01 | Stop reason: HOSPADM

## 2022-09-01 RX ORDER — SODIUM CHLORIDE 0.9 % (FLUSH) 0.9 %
5-40 SYRINGE (ML) INJECTION EVERY 8 HOURS
Status: DISCONTINUED | OUTPATIENT
Start: 2022-09-01 | End: 2022-09-01 | Stop reason: HOSPADM

## 2022-09-01 RX ORDER — ATROPINE SULFATE 0.1 MG/ML
0.5 INJECTION INTRAVENOUS
Status: DISCONTINUED | OUTPATIENT
Start: 2022-09-01 | End: 2022-09-01 | Stop reason: HOSPADM

## 2022-09-01 RX ORDER — SODIUM CHLORIDE 0.9 % (FLUSH) 0.9 %
5-40 SYRINGE (ML) INJECTION AS NEEDED
Status: DISCONTINUED | OUTPATIENT
Start: 2022-09-01 | End: 2022-09-01 | Stop reason: HOSPADM

## 2022-09-01 RX ORDER — SODIUM CHLORIDE 9 MG/ML
75 INJECTION, SOLUTION INTRAVENOUS CONTINUOUS
Status: DISCONTINUED | OUTPATIENT
Start: 2022-09-01 | End: 2022-09-01 | Stop reason: HOSPADM

## 2022-09-01 RX ORDER — NALOXONE HYDROCHLORIDE 0.4 MG/ML
0.4 INJECTION, SOLUTION INTRAMUSCULAR; INTRAVENOUS; SUBCUTANEOUS
Status: DISCONTINUED | OUTPATIENT
Start: 2022-09-01 | End: 2022-09-01 | Stop reason: HOSPADM

## 2022-09-01 RX ORDER — LIDOCAINE HYDROCHLORIDE 20 MG/ML
INJECTION, SOLUTION EPIDURAL; INFILTRATION; INTRACAUDAL; PERINEURAL AS NEEDED
Status: DISCONTINUED | OUTPATIENT
Start: 2022-09-01 | End: 2022-09-01 | Stop reason: HOSPADM

## 2022-09-01 RX ORDER — PROPOFOL 10 MG/ML
INJECTION, EMULSION INTRAVENOUS AS NEEDED
Status: DISCONTINUED | OUTPATIENT
Start: 2022-09-01 | End: 2022-09-01 | Stop reason: HOSPADM

## 2022-09-01 RX ORDER — DEXTROMETHORPHAN/PSEUDOEPHED 2.5-7.5/.8
1.2 DROPS ORAL
Status: DISCONTINUED | OUTPATIENT
Start: 2022-09-01 | End: 2022-09-01 | Stop reason: HOSPADM

## 2022-09-01 RX ADMIN — PROPOFOL 130 MG: 10 INJECTION, EMULSION INTRAVENOUS at 10:23

## 2022-09-01 RX ADMIN — DEXMEDETOMIDINE HYDROCHLORIDE 4 MCG: 100 INJECTION, SOLUTION, CONCENTRATE INTRAVENOUS at 10:23

## 2022-09-01 RX ADMIN — PROPOFOL 10 MG: 10 INJECTION, EMULSION INTRAVENOUS at 10:26

## 2022-09-01 RX ADMIN — LIDOCAINE HYDROCHLORIDE 20 MG: 20 INJECTION, SOLUTION EPIDURAL; INFILTRATION; INTRACAUDAL; PERINEURAL at 10:23

## 2022-09-01 RX ADMIN — SODIUM CHLORIDE 75 ML/HR: 9 INJECTION, SOLUTION INTRAVENOUS at 10:16

## 2022-09-01 RX ADMIN — PROPOFOL 10 MG: 10 INJECTION, EMULSION INTRAVENOUS at 10:30

## 2022-09-01 NOTE — PROGRESS NOTES
Endoscopy Case End Note:    6107:  Procedure scope was pre-cleaned, per protocol, at bedside by Lilliam BAUTISTA      1034:  Report received from anesthesia - Buddy  CRNA. See anesthesia flowsheet for intra-procedure vital signs and events. 1035:  Glasses returned to patient.

## 2022-09-01 NOTE — PROCEDURES
NAME:  Milla Smith   :   [birth year]   MRN:   [id number]     Date/Time:  2022 10:33 AM    Colonoscopy Operative Report    Procedure Type:   Colonoscopy --screening     Indications:     Screening colonoscopy  Pre-operative Diagnosis: see indication above  Post-operative Diagnosis:  See findings below  :  Zenia Lowe MD  Referring Provider: --Alysha Singer MD    Exam:  Airway: clear, no airway problems anticipated  Heart: RRR, without gallops or rubs  Lungs: clear bilaterally without wheezes, crackles, or rhonchi  Abdomen: soft, nontender, nondistended, bowel sounds present  Mental Status: awake, alert and oriented to person, place and time    Sedation:  MAC anesthesia Propofol  Procedure Details:  After informed consent was obtained with all risks and benefits of procedure explained and preoperative exam completed, the patient was taken to the endoscopy suite and placed in the left lateral decubitus position. Upon sequential sedation as per above, a digital rectal exam was performed demonstrating internal hemorrhoids. The Olympus videocolonoscope  was inserted in the rectum and carefully advanced to the cecum, which was identified by the ileocecal valve and appendiceal orifice. The quality of preparation was good. The colonoscope was slowly withdrawn with careful evaluation between folds. Retroflexion in the rectum was completed demonstrating internal hemorrhoids. Findings:   Normal colonoscopy through to the cecum  Small internal hemorrhoids seen on retroflexion    Specimen Removed:  None  Complications: None. EBL:  None. Impression:    Normal colonoscopy through to the cecum  Small internal hemorrhoids seen on retroflexion    Recommendations:   No further colonoscopies necessary for screening purposes    Discharge Disposition:  Home in the company of a  when able to ambulate.       Josse Rodriguez MD

## 2022-09-01 NOTE — ANESTHESIA PREPROCEDURE EVALUATION
Anesthetic History   No history of anesthetic complications            Review of Systems / Medical History  Patient summary reviewed, nursing notes reviewed and pertinent labs reviewed    Pulmonary                Comments: Former smoker - Quit 1988   Neuro/Psych   Within defined limits           Cardiovascular              CAD, cardiac stents (2006) and hyperlipidemia    Exercise tolerance: >4 METS  Comments: No ECG on file    Hx Portal vein thrombosis   GI/Hepatic/Renal               Comments: Screening colonoscopy (9/1/22)    Hx Acute calculous cholecystitis s/p cholecystectomy (6/11/18) Endo/Other            Comments: Hx Testicular mass Other Findings   Comments: BPH         Physical Exam    Airway  Mallampati: II    Neck ROM: normal range of motion   Mouth opening: Normal     Cardiovascular  Regular rate and rhythm,  S1 and S2 normal,  no murmur, click, rub, or gallop             Dental    Dentition: Upper partial plate     Pulmonary  Breath sounds clear to auscultation               Abdominal  GI exam deferred       Other Findings            Anesthetic Plan    ASA: 3  Anesthesia type: MAC          Induction: Intravenous  Anesthetic plan and risks discussed with: Patient

## 2022-09-01 NOTE — ROUTINE PROCESS
TRANSFER - IN REPORT:    Verbal report received from Lucinda(name) on Desiree Stands  being received from endo(unit) for routine progression of care      Report consisted of patients Situation, Background, Assessment and   Recommendations(SBAR). Information from the following report(s) SBAR, Procedure Summary, and MAR was reviewed with the receiving nurse. Opportunity for questions and clarification was provided. Assessment completed upon patients arrival to unit and care assumed. Complex Repair Preamble Text (Leave Blank If You Do Not Want): Extensive wide undermining was performed.

## 2022-09-01 NOTE — DISCHARGE INSTRUCTIONS
Yonatan Brewer  [id number]  1950    COLON DISCHARGE INSTRUCTIONS  Discomfort:  Redness at IV site- apply warm compress to area; if redness or soreness persist- contact your physician  There may be a slight amount of blood passed from the rectum  Gaseous discomfort- walking, belching will help relieve any discomfort  You may not operate a vehicle for 12 hours  You may not engage in an occupation involving machinery or appliances for rest of today  You may not drink alcoholic beverages for at least 12 hours  Avoid making any critical decisions for at least 24 hour  DIET:   Regular diet. - however -  remember your colon is empty and a heavy meal will produce gas. Avoid these foods:  vegetables, fried / greasy foods, carbonated drinks for today  MEDICATION:  Per Medication Reconciliation       ACTIVITY:  You may not resume your normal daily activities until tomorrow AM; it is recommended that you spend the remainder of the day resting -  avoid any strenuous activity. CALL M.D. ANY SIGN OF:   Increasing pain, nausea, vomiting  Abdominal distension (swelling)  New increased bleeding (oral or rectal)  Fever (chills)  Pain in chest area  Bloody discharge from nose or mouth  Shortness of breath    You may  take any Advil, Aspirin, Ibuprofen, Motrin, Aleve, or Goodys for 10 days, ONLY  Tylenol as needed for pain.     IMPRESSION:  Impression:    Normal colonoscopy through to the cecum  Small internal hemorrhoids seen on retroflexion    Recommendations:   No further colonoscopies necessary for screening purposes    Follow-up Instructions:  Telephone # [phone]      Rosey Almanza MD

## 2022-09-01 NOTE — ANESTHESIA POSTPROCEDURE EVALUATION
Procedure(s):  COLONOSCOPY. MAC    Anesthesia Post Evaluation        Patient location during evaluation: PACU  Note status: Adequate. Level of consciousness: responsive to verbal stimuli and sleepy but conscious  Pain management: satisfactory to patient  Airway patency: patent  Anesthetic complications: no  Cardiovascular status: acceptable  Respiratory status: acceptable  Hydration status: acceptable  Comments: +Post-Anesthesia Evaluation and Assessment    Patient: Everardo Rater MRN: [id number]  SSN: [id number]   YOB: 1950  Age: 66 y.o. Sex: male      Cardiovascular Function/Vital Signs    /67   Pulse 62   Temp 36.8 °C (98.2 °F)   Resp 14   Ht 5' 7\" (1.702 m)   Wt 74.8 kg (165 lb)   SpO2 100%   BMI 25.84 kg/m²     Patient is status post Procedure(s):  COLONOSCOPY. Nausea/Vomiting: Controlled. Postoperative hydration reviewed and adequate. Pain:  Pain Scale 1: Visual (09/01/22 1036)  Pain Intensity 1: 0 (09/01/22 1036)   Managed. Neurological Status: At baseline. Mental Status and Level of Consciousness: Arousable. Pulmonary Status:   O2 Device: None (Room air) (09/01/22 1036)   Adequate oxygenation and airway patent. Complications related to anesthesia: None    Post-anesthesia assessment completed. No concerns.     Signed By: Zachary Camargo MD    9/1/2022  Post anesthesia nausea and vomiting:  controlled      INITIAL Post-op Vital signs:   Vitals Value Taken Time   /67 09/01/22 1036   Temp     Pulse 62 09/01/22 1036   Resp 14 09/01/22 1036   SpO2 100 % 09/01/22 1036

## 2022-09-01 NOTE — H&P
Gastroenterology Outpatient History and Physical    Patient: Tobi Witt    Physician: Nolvia Castle MD    Chief Complaint: CRC screening  History of Present Illness: No GI complaints    History:  Past Medical History:   Diagnosis Date    Enlarged prostate     Hypercholesterolemia       Past Surgical History:   Procedure Laterality Date    MD CARDIAC SURG PROCEDURE UNLIST  2006    stent placement      Social History     Socioeconomic History    Marital status:    Tobacco Use    Smoking status: Former     Types: Cigarettes     Quit date: 1988     Years since quittin.1    Smokeless tobacco: Never   Substance and Sexual Activity    Alcohol use: No    Drug use: Yes     Types: Prescription    History reviewed. No pertinent family history. Patient Active Problem List   Diagnosis Code    Acute calculous cholecystitis K80.00    Portal vein thrombosis I81    Testicular mass N50.89       Allergies: No Known Allergies  Medications:   Prior to Admission medications    Medication Sig Start Date End Date Taking? Authorizing Provider   aspirin 81 mg chewable tablet Take 1 Tab by mouth daily. 19  Yes Tiago Pendleton MD   multivitamin (ONE A DAY) tablet Take 1 Tab by mouth daily. Yes Provider, Kim   finasteride (PROSCAR) 5 mg tablet Take 5 mg by mouth daily. Yes Other, MD Michelle   tamsulosin (FLOMAX) 0.4 mg capsule Take 0.4 mg by mouth daily. Yes Other, MD Michelle     Physical Exam:   Vital Signs: Blood pressure (!) 192/90, pulse (!) 52, temperature 98.2 °F (36.8 °C), resp. rate 16, height 5' 7\" (1.702 m), weight 74.8 kg (165 lb), SpO2 100 %.   General: well developed, well nourished   HEENT: unremarkable   Heart: regular rhythm no mumur    Lungs: clear   Abdominal:  benign   Neurological: unremarkable   Extremities: no edema     Findings/Diagnosis: CRC screening  Plan of Care/Planned Procedure: Colonoscopy with conscious/deep sedation    Signed:  Nolvia Castle MD 2022

## 2023-05-23 RX ORDER — ASPIRIN 81 MG/1
81 TABLET, CHEWABLE ORAL DAILY
COMMUNITY
Start: 2019-01-18

## 2023-05-23 RX ORDER — TAMSULOSIN HYDROCHLORIDE 0.4 MG/1
0.4 CAPSULE ORAL DAILY
COMMUNITY

## 2023-05-23 RX ORDER — FINASTERIDE 5 MG/1
5 TABLET, FILM COATED ORAL DAILY
COMMUNITY

## (undated) DEVICE — Z DISCONTINUED PER MEDLINE LINE GAS SAMPLING O2/CO2 LNG AD 13 FT NSL W/ TBNG FILTERLINE

## (undated) DEVICE — APPLIER CLP M/L SHFT DIA5MM 15 LIG LIGAMAX 5

## (undated) DEVICE — APPLICATOR BNDG 1MM ADH PREMIERPRO EXOFIN

## (undated) DEVICE — ELECTRODE,RADIOTRANSLUCENT,FOAM,5PK: Brand: MEDLINE

## (undated) DEVICE — HANDLE LT SNAP ON ULT DURABLE LENS FOR TRUMPF ALC DISPOSABLE

## (undated) DEVICE — SUTURE SZ 0 27IN 5/8 CIR UR-6  TAPER PT VIOLET ABSRB VICRYL J603H

## (undated) DEVICE — UNIVERSAL FIXATION CANNULA: Brand: VERSAONE

## (undated) DEVICE — STERILE POLYISOPRENE POWDER-FREE SURGICAL GLOVES WITH EMOLLIENT COATING: Brand: PROTEXIS

## (undated) DEVICE — SOLIDIFIER FLD 2OZ 1500CC N DISINF IN BTL DISP SAFESORB

## (undated) DEVICE — Device

## (undated) DEVICE — SYR 3ML LL TIP 1/10ML GRAD --

## (undated) DEVICE — (D)PREP SKN CHLRAPRP APPL 26ML -- CONVERT TO ITEM 371833

## (undated) DEVICE — BLUNT TROCAR WITH THREADED ANCHOR: Brand: VERSAONE

## (undated) DEVICE — SYR 50ML LR LCK 1ML GRAD NSAF --

## (undated) DEVICE — BASIN EMSIS 16OZ GRAPHITE PLAS KID SHP MOLD GRAD FOR ORAL

## (undated) DEVICE — HYPODERMIC SAFETY NEEDLE: Brand: MAGELLAN

## (undated) DEVICE — SET ADMIN 16ML TBNG L100IN 2 Y INJ SITE IV PIGGY BK DISP

## (undated) DEVICE — STERILE POLYISOPRENE POWDER-FREE SURGICAL GLOVES: Brand: PROTEXIS

## (undated) DEVICE — SYR 10ML LUER LOK 1/5ML GRAD --

## (undated) DEVICE — INFECTION CONTROL KIT SYS

## (undated) DEVICE — KENDALL SCD EXPRESS SLEEVES, KNEE LENGTH, MEDIUM: Brand: KENDALL SCD

## (undated) DEVICE — REM POLYHESIVE ADULT PATIENT RETURN ELECTRODE: Brand: VALLEYLAB

## (undated) DEVICE — BLADELESS OPTICAL TROCAR WITH FIXATION CANNULA: Brand: VERSAPORT

## (undated) DEVICE — NEEDLE HYPO 25GA L1.5IN BVL ORIENTED ECLIPSE

## (undated) DEVICE — 1200 GUARD II KIT W/5MM TUBE W/O VAC TUBE: Brand: GUARDIAN

## (undated) DEVICE — NEONATAL-ADULT SPO2 SENSOR: Brand: NELLCOR

## (undated) DEVICE — CATH IV AUTOGRD BC PNK 20GA 25 -- INSYTE

## (undated) DEVICE — SURGICAL PROCEDURE KIT GEN LAPAROSCOPY LF

## (undated) DEVICE — BAG SPEC RETRV 275ML 10ML DISPOSABLE RELIACATCH

## (undated) DEVICE — BLADE ASSEMB CLP HAIR FINE --

## (undated) DEVICE — SUTURE MCRYL SZ 5 0 L18IN ABSRB UD PC 5 L19MM 1 2 CIR Y822G

## (undated) DEVICE — SUTURE MCRYL SZ 4-0 L27IN ABSRB UD L19MM PS-2 1/2 CIR PRIM Y426H

## (undated) DEVICE — TOWEL 4 PLY TISS 19X30 SUE WHT

## (undated) DEVICE — ELECTRODE ES 36CM LAP FLAT L HK COAT DISP CLEANCOAT

## (undated) DEVICE — DEVON™ KNEE AND BODY STRAP 60" X 3" (1.5 M X 7.6 CM): Brand: DEVON